# Patient Record
Sex: MALE | Race: WHITE | NOT HISPANIC OR LATINO | Employment: FULL TIME | URBAN - METROPOLITAN AREA
[De-identification: names, ages, dates, MRNs, and addresses within clinical notes are randomized per-mention and may not be internally consistent; named-entity substitution may affect disease eponyms.]

---

## 2023-09-16 LAB
ALANINE AMINOTRANSFERASE (SGPT) (U/L) IN SER/PLAS: 25 U/L (ref 10–52)
ANION GAP IN SER/PLAS: 12 MMOL/L (ref 10–20)
ASPARTATE AMINOTRANSFERASE (SGOT) (U/L) IN SER/PLAS: 17 U/L (ref 9–39)
CALCIUM (MG/DL) IN SER/PLAS: 8.9 MG/DL (ref 8.6–10.3)
CARBON DIOXIDE, TOTAL (MMOL/L) IN SER/PLAS: 25 MMOL/L (ref 21–32)
CHLORIDE (MMOL/L) IN SER/PLAS: 107 MMOL/L (ref 98–107)
CHOLESTEROL (MG/DL) IN SER/PLAS: 167 MG/DL (ref 0–199)
CHOLESTEROL IN HDL (MG/DL) IN SER/PLAS: 47.7 MG/DL
CHOLESTEROL/HDL RATIO: 3.5
CREATININE (MG/DL) IN SER/PLAS: 0.97 MG/DL (ref 0.5–1.3)
GFR MALE: >90 ML/MIN/1.73M2
GLUCOSE (MG/DL) IN SER/PLAS: 107 MG/DL (ref 74–99)
LDL: 94 MG/DL (ref 0–99)
POTASSIUM (MMOL/L) IN SER/PLAS: 4.4 MMOL/L (ref 3.5–5.3)
SODIUM (MMOL/L) IN SER/PLAS: 140 MMOL/L (ref 136–145)
TRIGLYCERIDE (MG/DL) IN SER/PLAS: 128 MG/DL (ref 0–149)
UREA NITROGEN (MG/DL) IN SER/PLAS: 20 MG/DL (ref 6–23)
VLDL: 26 MG/DL (ref 0–40)

## 2023-10-06 ENCOUNTER — DOCUMENTATION (OUTPATIENT)
Dept: CARDIOLOGY | Facility: CLINIC | Age: 53
End: 2023-10-06

## 2023-10-06 NOTE — PROGRESS NOTES
I saw this patient in August and ordered a 14-day Holter.  I just received results from the Holter monitor that was completed on 9 7 for 14 days.  Patient had no atrial fibrillation but he had 3 bursts of wide-complex tachycardia the 1 episode 6 beats that was a little bit faster 179 bpm at 520 3 in the afternoon and another episode of 4 beats and then while sleeping at 4:00 in the morning he had a run of 11 beats but that was only in the 120 beat range.  Recent electrolytes were normal.

## 2024-01-17 DIAGNOSIS — I25.10 CORONARY ARTERY DISEASE INVOLVING NATIVE CORONARY ARTERY OF NATIVE HEART, UNSPECIFIED WHETHER ANGINA PRESENT: Primary | ICD-10-CM

## 2024-01-17 DIAGNOSIS — I47.29 NSVT (NONSUSTAINED VENTRICULAR TACHYCARDIA) (MULTI): ICD-10-CM

## 2024-01-17 RX ORDER — METOPROLOL SUCCINATE 100 MG/1
100 TABLET, EXTENDED RELEASE ORAL DAILY
COMMUNITY
End: 2024-01-17 | Stop reason: SDUPTHER

## 2024-01-18 RX ORDER — METOPROLOL SUCCINATE 100 MG/1
100 TABLET, EXTENDED RELEASE ORAL DAILY
Qty: 90 TABLET | Refills: 0 | Status: SHIPPED | OUTPATIENT
Start: 2024-01-18 | End: 2024-02-13 | Stop reason: SDUPTHER

## 2024-02-12 PROBLEM — I46.9 CARDIAC ARREST (MULTI): Status: ACTIVE | Noted: 2024-02-12

## 2024-02-12 PROBLEM — I21.3 STEMI (ST ELEVATION MYOCARDIAL INFARCTION) (MULTI): Status: ACTIVE | Noted: 2024-02-12

## 2024-02-12 PROBLEM — I51.9 LV DYSFUNCTION: Status: ACTIVE | Noted: 2024-02-12

## 2024-02-12 PROBLEM — E87.6 HYPOKALEMIA: Status: ACTIVE | Noted: 2024-02-12

## 2024-02-12 PROBLEM — R07.9 CHEST PAIN: Status: ACTIVE | Noted: 2024-02-12

## 2024-02-12 PROBLEM — I25.10 ASHD (ARTERIOSCLEROTIC HEART DISEASE): Status: ACTIVE | Noted: 2024-02-12

## 2024-02-12 PROBLEM — E78.1 HYPERTRIGLYCERIDEMIA: Status: ACTIVE | Noted: 2024-02-12

## 2024-02-12 PROBLEM — F41.8 DEPRESSION WITH ANXIETY: Status: ACTIVE | Noted: 2024-02-12

## 2024-02-12 PROBLEM — I47.29 NSVT (NONSUSTAINED VENTRICULAR TACHYCARDIA) (MULTI): Status: ACTIVE | Noted: 2024-02-12

## 2024-02-12 PROBLEM — D50.9 IRON DEFICIENCY ANEMIA: Status: ACTIVE | Noted: 2024-02-12

## 2024-02-12 RX ORDER — ICOSAPENT ETHYL 1000 MG/1
2 CAPSULE ORAL 2 TIMES DAILY
COMMUNITY
End: 2024-02-13 | Stop reason: SDUPTHER

## 2024-02-12 RX ORDER — TICAGRELOR 90 MG/1
90 TABLET ORAL 2 TIMES DAILY
COMMUNITY
End: 2024-02-13 | Stop reason: WASHOUT

## 2024-02-12 RX ORDER — ATORVASTATIN CALCIUM 80 MG/1
80 TABLET, FILM COATED ORAL NIGHTLY
COMMUNITY
End: 2024-02-13 | Stop reason: SDUPTHER

## 2024-02-12 RX ORDER — NAPROXEN SODIUM 220 MG/1
81 TABLET, FILM COATED ORAL DAILY
COMMUNITY
Start: 2023-07-29 | End: 2024-02-13 | Stop reason: SDUPTHER

## 2024-02-12 RX ORDER — MELATONIN 3 MG
1 CAPSULE ORAL NIGHTLY PRN
COMMUNITY
Start: 2022-09-09 | End: 2024-02-13 | Stop reason: WASHOUT

## 2024-02-12 RX ORDER — SACUBITRIL AND VALSARTAN 24; 26 MG/1; MG/1
1 TABLET, FILM COATED ORAL 2 TIMES DAILY
COMMUNITY
End: 2024-02-13 | Stop reason: SDUPTHER

## 2024-02-12 RX ORDER — OMEPRAZOLE 20 MG/1
1 CAPSULE, DELAYED RELEASE ORAL DAILY
COMMUNITY
Start: 2022-09-07

## 2024-02-13 ENCOUNTER — OFFICE VISIT (OUTPATIENT)
Dept: CARDIOLOGY | Facility: CLINIC | Age: 54
End: 2024-02-13
Payer: COMMERCIAL

## 2024-02-13 VITALS
SYSTOLIC BLOOD PRESSURE: 114 MMHG | HEIGHT: 69 IN | HEART RATE: 76 BPM | WEIGHT: 196 LBS | DIASTOLIC BLOOD PRESSURE: 76 MMHG | BODY MASS INDEX: 29.03 KG/M2

## 2024-02-13 DIAGNOSIS — I25.10 ASHD (ARTERIOSCLEROTIC HEART DISEASE): Primary | ICD-10-CM

## 2024-02-13 DIAGNOSIS — I47.29 NSVT (NONSUSTAINED VENTRICULAR TACHYCARDIA) (MULTI): ICD-10-CM

## 2024-02-13 DIAGNOSIS — I25.10 CORONARY ARTERY DISEASE INVOLVING NATIVE CORONARY ARTERY OF NATIVE HEART, UNSPECIFIED WHETHER ANGINA PRESENT: ICD-10-CM

## 2024-02-13 DIAGNOSIS — I51.9 LV DYSFUNCTION: ICD-10-CM

## 2024-02-13 PROCEDURE — 99214 OFFICE O/P EST MOD 30 MIN: CPT | Performed by: INTERNAL MEDICINE

## 2024-02-13 PROCEDURE — 1036F TOBACCO NON-USER: CPT | Performed by: INTERNAL MEDICINE

## 2024-02-13 RX ORDER — ATORVASTATIN CALCIUM 80 MG/1
80 TABLET, FILM COATED ORAL NIGHTLY
Qty: 90 TABLET | Refills: 3 | Status: SHIPPED | OUTPATIENT
Start: 2024-02-13 | End: 2025-02-12

## 2024-02-13 RX ORDER — ICOSAPENT ETHYL 1000 MG/1
2 CAPSULE ORAL 2 TIMES DAILY
Qty: 360 CAPSULE | Refills: 3 | Status: SHIPPED | OUTPATIENT
Start: 2024-02-13 | End: 2025-02-12

## 2024-02-13 RX ORDER — EZETIMIBE 10 MG/1
10 TABLET ORAL DAILY
Qty: 30 TABLET | Refills: 11 | Status: SHIPPED | OUTPATIENT
Start: 2024-02-13 | End: 2024-03-01 | Stop reason: SINTOL

## 2024-02-13 RX ORDER — METOPROLOL SUCCINATE 100 MG/1
100 TABLET, EXTENDED RELEASE ORAL DAILY
Qty: 90 TABLET | Refills: 3 | Status: SHIPPED | OUTPATIENT
Start: 2024-02-13 | End: 2025-02-12

## 2024-02-13 RX ORDER — SACUBITRIL AND VALSARTAN 24; 26 MG/1; MG/1
1 TABLET, FILM COATED ORAL 2 TIMES DAILY
Qty: 180 TABLET | Refills: 3 | Status: SHIPPED | OUTPATIENT
Start: 2024-02-13 | End: 2025-02-12

## 2024-02-13 RX ORDER — NAPROXEN SODIUM 220 MG/1
81 TABLET, FILM COATED ORAL DAILY
Qty: 90 TABLET | Refills: 3 | Status: SHIPPED | OUTPATIENT
Start: 2024-02-13 | End: 2025-02-12

## 2024-02-13 NOTE — PATIENT INSTRUCTIONS
Thanks for following up in office today.    1)  We reviewed your cholesterol blood work I am happy to see that your HDL ( your good cholesterol) has increased which is a good thing. Exercising has helped this to increase.  Your LDL (bad cholesterol ) is not at goal yet though.  So I am adding zetia 10 mg daily.   I am sending in 30 days to see if you tolerate it.  2)  When you are done with the current bottle of brilinta you can stop it. You need to stay on aspirin for life.   You need to let me know if you are having any chest pain, heart burn.     3)  Please continue your cardiac medications as prescribed.  I have sent in refills for your medications to your pharmacy.    Follow up with BUFFY DEJESUS  in  6  months  If you have any questions, please call [phone] and choose option for Dr. Ha's nurse Beth Deluna

## 2024-02-13 NOTE — PROGRESS NOTES
Chief Complaint:   No chief complaint on file.     History Of Present Illness:    Enrrique Mariano is a 54 y.o. male presenting for 6 month follow up.  H/o STEMI and cardiac arrest in 8/22 s/p OLGA LIDIA x2 to mid LAD, dyslipidemia, superficial vein thrombosis, GERD, depression/anxiety who presents for follow up.    Since 10/2023 and last visit with Jam Vieira, telling me that since his holter he had 1-2 episodes of brief palps, but otherwise no long episodes.  He denies any chest pain/pressure, heartburn.  He has been working out lately - rides a recumbent bike at home, walks on a treadmill (3.5mph, half an hour at a time), and has been lifting some weight (chest/lats/legs).  No LE edema, orthopnea, PND.  No dizziness/syncope.      ROS:  The remainder of the review of systems was obtained, as was negative as pertains to the chief complaint.     CV testing reviewed :  9/2023  Lipid labs chol 167, HDL 47.7, LDL 94, trig 128 BMP  K 4.4  BUN 20  crea 0.97  alt 25    ast 17    Holter monitor worn from 8/24-9/7/23 Predominant underlying rhythm was SR.  Min HR 47 max 179 av 73.  Three SVT runs occurred.  Fastest 6 beats max rate of 179 longest 11 beats av . SVE's Ve's <1%.    Stress MPI 10/22   IMPRESSION:  Medium-sized area of fixed perfusion defect of the apical, apical and  mid anterior, apical and mid anteroseptal segments, consistent with  myocardial scar or hibernating myocardium in the LAD territory.  Normal left ventricular systolic function on post stress gated  imaging.    TTE 9/2022 EF 45% global hypokineses of LV trace pulmonic valve regurg     LHC  8/2022  OLGA LIDIA x2 of mid LAD      Last Recorded Vitals:  There were no vitals filed for this visit.    Past Medical History:  He has a past medical history of Other conditions influencing health status and Other iron deficiency anemias (09/15/2022).    Past Surgical History:  He has a past surgical history that includes Knee surgery (11/23/2016); Hernia repair  (11/23/2016); Back surgery (11/23/2016); and Other surgical history (09/08/2022).      Social History:  He has no history on file for tobacco use, alcohol use, and drug use.    Family History:  No family history on file.     Allergies:  Patient has no allergy information on record.    Outpatient Medications:  Current Outpatient Medications   Medication Instructions    aspirin 81 mg, oral, Daily, CHEW AND SWALLOW    atorvastatin (LIPITOR) 80 mg, oral, Nightly    Brilinta 90 mg, oral, 2 times daily, AS DIRECTED    Entresto 24-26 mg tablet 1 tablet, oral, 2 times daily    melatonin 3 mg capsule 1 capsule, oral, Nightly PRN    metoprolol succinate XL (TOPROL-XL) 100 mg, oral, Daily    omeprazole (PriLOSEC) 20 mg DR capsule 1 capsule, oral, Daily    Vascepa 1 gram capsule 2 capsules, oral, 2 times daily       Physical Exam:  Physical Exam  HENT:      Head: Normocephalic.      Nose: Nose normal.      Mouth/Throat:      Mouth: Mucous membranes are moist.   Cardiovascular:      Rate and Rhythm: Normal rate and regular rhythm.      Comments: No carotid bruits   Pulmonary:      Effort: Pulmonary effort is normal.      Breath sounds: Normal breath sounds.   Abdominal:      Palpations: Abdomen is soft.   Musculoskeletal:         General: Normal range of motion.   Skin:     General: Skin is warm and dry.   Neurological:      General: No focal deficit present.      Mental Status: He is alert.   Psychiatric:         Mood and Affect: Mood normal.            Last Labs:  CBC -  Lab Results   Component Value Date    WBC 9.1 10/07/2022    HGB 14.9 10/07/2022    HCT 43.7 10/07/2022    MCV 92 10/07/2022     10/07/2022       CMP -  Lab Results   Component Value Date    CALCIUM 8.9 09/16/2023    PHOS 3.2 08/29/2022    PROT 7.2 10/07/2022    ALBUMIN 4.5 10/07/2022    AST 17 09/16/2023    ALT 25 09/16/2023    ALKPHOS 89 10/07/2022    BILITOT 0.6 10/07/2022       LIPID PANEL -   Lab Results   Component Value Date    CHOL 167 09/16/2023     TRIG 128 09/16/2023    HDL 47.7 09/16/2023    CHHDL 3.5 09/16/2023    LDLF 94 09/16/2023    VLDL 26 09/16/2023    NHDL 108 01/14/2023       RENAL FUNCTION PANEL -   Lab Results   Component Value Date    GLUCOSE 107 (H) 09/16/2023     09/16/2023    K 4.4 09/16/2023     09/16/2023    CO2 25 09/16/2023    ANIONGAP 12 09/16/2023    BUN 20 09/16/2023    CREATININE 0.97 09/16/2023    GFRMALE >90 09/16/2023    CALCIUM 8.9 09/16/2023    PHOS 3.2 08/29/2022    ALBUMIN 4.5 10/07/2022        Lab Results   Component Value Date    BNP 16 08/27/2022           Assessment/Plan   Pleasant 55yo M with h/o STEMI and cardiac arrest in 8/22 s/p OLGA LIDIA x2 to mid LAD, dyslipidemia, superficial vein thrombosis, GERD, depression/anxiety who presents for follow up.  8/2022 - Cardiac arrest with anterior STEMI s/p OLGA LIDIA x2 to mid LAD.     Impression:  No chest pain/pressure or significant/recurrent heartburn reported (heartburn was one of his sx prior to the cardiac arrest).  No palpitations, dizziness.    Exercising regularly at moderate intensity without exertional sx  Did see EP Dr. Perera post 30 day monitor, which demonstrated NSVT - given EF 45% and only nonsustained events, no recommendation was made for ICD.      Recs:  -continue ASA 81mg daily  -ok to stop brilinta when he runs out  -metop succinate to 100mg daily  -high intensity statin therapy - atorva 80  -add ezetimibe 10mg daily for LDL in the 90's  -continue vascepa for high TG's  -tolerating entresto 24-26mg bid, BMP 9/2023 reviewed and K/Cr WNL  -discussed rebekah-mediterranean style diet  -continue with regular mod intensity exercise  -aggressive secondary prevention  -he has been able to abstain from tobacco use

## 2024-03-01 ENCOUNTER — TELEPHONE (OUTPATIENT)
Dept: CARDIOLOGY | Facility: CLINIC | Age: 54
End: 2024-03-01
Payer: COMMERCIAL

## 2024-03-01 NOTE — TELEPHONE ENCOUNTER
Called patient and he tells me that he noted issues shortly after starting to take the zetia.  He is concerned because he also finished his brilinta a few days after starting the zetia.  Told him that Dr Ha said to stop the zetia.  He will call us next week if he is still having the heartburn chest pain and or to just update us with how he is doing.   Told him if he has any concerning  pain or any other new issues to just go to the ER.         ----- Message from Melania Tao sent at 3/1/2024  2:44 PM EST -----  Regarding: Zetia Side Effects - Chest Pain and Heartburn  Hi Beth - patient called to report he is not tolerating the Zetia well and is experiencing bad heartburn and intermittent CP. He would greatly appreciate a call back to discuss next steps, thank you

## 2024-03-07 ENCOUNTER — HOSPITAL ENCOUNTER (EMERGENCY)
Facility: HOSPITAL | Age: 54
Discharge: HOME | End: 2024-03-07
Attending: STUDENT IN AN ORGANIZED HEALTH CARE EDUCATION/TRAINING PROGRAM
Payer: COMMERCIAL

## 2024-03-07 ENCOUNTER — APPOINTMENT (OUTPATIENT)
Dept: CARDIOLOGY | Facility: HOSPITAL | Age: 54
End: 2024-03-07
Payer: COMMERCIAL

## 2024-03-07 ENCOUNTER — APPOINTMENT (OUTPATIENT)
Dept: RADIOLOGY | Facility: HOSPITAL | Age: 54
End: 2024-03-07
Payer: COMMERCIAL

## 2024-03-07 VITALS
HEART RATE: 70 BPM | DIASTOLIC BLOOD PRESSURE: 66 MMHG | WEIGHT: 195 LBS | HEIGHT: 69 IN | RESPIRATION RATE: 20 BRPM | TEMPERATURE: 98.6 F | OXYGEN SATURATION: 97 % | BODY MASS INDEX: 28.88 KG/M2 | SYSTOLIC BLOOD PRESSURE: 120 MMHG

## 2024-03-07 DIAGNOSIS — R07.9 ACUTE CHEST PAIN: Primary | ICD-10-CM

## 2024-03-07 LAB
ALBUMIN SERPL BCP-MCNC: 4.4 G/DL (ref 3.4–5)
ALP SERPL-CCNC: 46 U/L (ref 33–120)
ALT SERPL W P-5'-P-CCNC: 54 U/L (ref 10–52)
ANION GAP SERPL CALC-SCNC: 11 MMOL/L (ref 10–20)
AST SERPL W P-5'-P-CCNC: 26 U/L (ref 9–39)
BASOPHILS # BLD AUTO: 0.07 X10*3/UL (ref 0–0.1)
BASOPHILS NFR BLD AUTO: 0.8 %
BILIRUB SERPL-MCNC: 0.5 MG/DL (ref 0–1.2)
BNP SERPL-MCNC: 34 PG/ML (ref 0–99)
BUN SERPL-MCNC: 20 MG/DL (ref 6–23)
CALCIUM SERPL-MCNC: 9.2 MG/DL (ref 8.6–10.3)
CARDIAC TROPONIN I PNL SERPL HS: 3 NG/L (ref 0–20)
CHLORIDE SERPL-SCNC: 105 MMOL/L (ref 98–107)
CO2 SERPL-SCNC: 27 MMOL/L (ref 21–32)
CREAT SERPL-MCNC: 1.05 MG/DL (ref 0.5–1.3)
D DIMER PPP FEU-MCNC: 272 NG/ML FEU
EGFRCR SERPLBLD CKD-EPI 2021: 85 ML/MIN/1.73M*2
EOSINOPHIL # BLD AUTO: 0.39 X10*3/UL (ref 0–0.7)
EOSINOPHIL NFR BLD AUTO: 4.5 %
ERYTHROCYTE [DISTWIDTH] IN BLOOD BY AUTOMATED COUNT: 11.7 % (ref 11.5–14.5)
GLUCOSE SERPL-MCNC: 96 MG/DL (ref 74–99)
HCT VFR BLD AUTO: 44.4 % (ref 41–52)
HGB BLD-MCNC: 15.7 G/DL (ref 13.5–17.5)
IMM GRANULOCYTES # BLD AUTO: 0.03 X10*3/UL (ref 0–0.7)
IMM GRANULOCYTES NFR BLD AUTO: 0.3 % (ref 0–0.9)
LYMPHOCYTES # BLD AUTO: 4 X10*3/UL (ref 1.2–4.8)
LYMPHOCYTES NFR BLD AUTO: 46.6 %
MAGNESIUM SERPL-MCNC: 1.97 MG/DL (ref 1.6–2.4)
MCH RBC QN AUTO: 32.7 PG (ref 26–34)
MCHC RBC AUTO-ENTMCNC: 35.4 G/DL (ref 32–36)
MCV RBC AUTO: 93 FL (ref 80–100)
MONOCYTES # BLD AUTO: 0.63 X10*3/UL (ref 0.1–1)
MONOCYTES NFR BLD AUTO: 7.3 %
NEUTROPHILS # BLD AUTO: 3.47 X10*3/UL (ref 1.2–7.7)
NEUTROPHILS NFR BLD AUTO: 40.5 %
NRBC BLD-RTO: 0 /100 WBCS (ref 0–0)
PLATELET # BLD AUTO: 212 X10*3/UL (ref 150–450)
POTASSIUM SERPL-SCNC: 4.2 MMOL/L (ref 3.5–5.3)
PROT SERPL-MCNC: 7.3 G/DL (ref 6.4–8.2)
RBC # BLD AUTO: 4.8 X10*6/UL (ref 4.5–5.9)
SODIUM SERPL-SCNC: 139 MMOL/L (ref 136–145)
WBC # BLD AUTO: 8.6 X10*3/UL (ref 4.4–11.3)

## 2024-03-07 PROCEDURE — 84484 ASSAY OF TROPONIN QUANT: CPT | Performed by: STUDENT IN AN ORGANIZED HEALTH CARE EDUCATION/TRAINING PROGRAM

## 2024-03-07 PROCEDURE — 71046 X-RAY EXAM CHEST 2 VIEWS: CPT

## 2024-03-07 PROCEDURE — 36415 COLL VENOUS BLD VENIPUNCTURE: CPT | Performed by: STUDENT IN AN ORGANIZED HEALTH CARE EDUCATION/TRAINING PROGRAM

## 2024-03-07 PROCEDURE — 85025 COMPLETE CBC W/AUTO DIFF WBC: CPT | Performed by: STUDENT IN AN ORGANIZED HEALTH CARE EDUCATION/TRAINING PROGRAM

## 2024-03-07 PROCEDURE — 80053 COMPREHEN METABOLIC PANEL: CPT | Performed by: STUDENT IN AN ORGANIZED HEALTH CARE EDUCATION/TRAINING PROGRAM

## 2024-03-07 PROCEDURE — 85379 FIBRIN DEGRADATION QUANT: CPT | Performed by: STUDENT IN AN ORGANIZED HEALTH CARE EDUCATION/TRAINING PROGRAM

## 2024-03-07 PROCEDURE — 99283 EMERGENCY DEPT VISIT LOW MDM: CPT | Mod: 25

## 2024-03-07 PROCEDURE — 83880 ASSAY OF NATRIURETIC PEPTIDE: CPT | Performed by: STUDENT IN AN ORGANIZED HEALTH CARE EDUCATION/TRAINING PROGRAM

## 2024-03-07 PROCEDURE — 83735 ASSAY OF MAGNESIUM: CPT | Performed by: STUDENT IN AN ORGANIZED HEALTH CARE EDUCATION/TRAINING PROGRAM

## 2024-03-07 PROCEDURE — 71046 X-RAY EXAM CHEST 2 VIEWS: CPT | Performed by: RADIOLOGY

## 2024-03-07 PROCEDURE — 93005 ELECTROCARDIOGRAM TRACING: CPT

## 2024-03-07 ASSESSMENT — PAIN SCALES - GENERAL
PAINLEVEL_OUTOF10: 6
PAINLEVEL_OUTOF10: 1

## 2024-03-07 ASSESSMENT — LIFESTYLE VARIABLES
EVER HAD A DRINK FIRST THING IN THE MORNING TO STEADY YOUR NERVES TO GET RID OF A HANGOVER: NO
HAVE YOU EVER FELT YOU SHOULD CUT DOWN ON YOUR DRINKING: NO
HAVE PEOPLE ANNOYED YOU BY CRITICIZING YOUR DRINKING: NO
EVER FELT BAD OR GUILTY ABOUT YOUR DRINKING: NO

## 2024-03-07 ASSESSMENT — COLUMBIA-SUICIDE SEVERITY RATING SCALE - C-SSRS
6. HAVE YOU EVER DONE ANYTHING, STARTED TO DO ANYTHING, OR PREPARED TO DO ANYTHING TO END YOUR LIFE?: NO
1. IN THE PAST MONTH, HAVE YOU WISHED YOU WERE DEAD OR WISHED YOU COULD GO TO SLEEP AND NOT WAKE UP?: NO
2. HAVE YOU ACTUALLY HAD ANY THOUGHTS OF KILLING YOURSELF?: NO

## 2024-03-07 ASSESSMENT — PAIN DESCRIPTION - FREQUENCY: FREQUENCY: CONSTANT/CONTINUOUS

## 2024-03-07 ASSESSMENT — PAIN DESCRIPTION - LOCATION: LOCATION: CHEST

## 2024-03-07 ASSESSMENT — PAIN DESCRIPTION - DESCRIPTORS: DESCRIPTORS: SQUEEZING

## 2024-03-07 ASSESSMENT — PAIN - FUNCTIONAL ASSESSMENT: PAIN_FUNCTIONAL_ASSESSMENT: 0-10

## 2024-03-07 ASSESSMENT — PAIN DESCRIPTION - ORIENTATION: ORIENTATION: LEFT

## 2024-03-07 ASSESSMENT — PAIN DESCRIPTION - PAIN TYPE: TYPE: ACUTE PAIN

## 2024-03-07 NOTE — ED PROVIDER NOTES
"HPI   Chief Complaint   Patient presents with    Chest Pain     Started about an hour ago. Hx of MI with cardiac arrest. Chest pain and radiates to left shoulder and arm. Was taken off of Brilinta a few weeks ago and started new cholesterol medication \"Zetia\"       This is a 54-year-old male with past medical history of STEMI and cardiac arrest with OLGA LIDIA x 2 to mid LAD past medical history of hyperlipidemia, GERD, anxiety who presents emerged department for chest pain.  Patient began having chest pain at 12 states the last about 30 seconds and is been intermittent.  He feels like a pressure on the left side of his chest and did go into his left shoulder.  That time he did feel diaphoretic and short of breath.  He is unsure what his last heart attack felt like since he did have a cardiac arrest.  He states been having intermittent chest pain since the medication change and they stopped the Brilinta.  Reports has never had chest pain that went into his left shoulder before.  His pain started when he was sitting down.  He does have a history of blood clots several of the superficial this was also after a knee surgery.  No other issues or concerns.  He denies any swelling of his legs.  Currently he is not having chest pain                        [place] Coma Scale Score: 15                  Patient History   Past Medical History:   Diagnosis Date    Other conditions influencing health status     No significant past medical history    Other iron deficiency anemias 09/15/2022    Other iron deficiency anemia     Past Surgical History:   Procedure Laterality Date    BACK SURGERY  11/23/2016    Back Surgery    HERNIA REPAIR  11/23/2016    Hernia Repair    KNEE SURGERY  11/23/2016    Knee Surgery    OTHER SURGICAL HISTORY  09/08/2022    Cardiac catheterization with stent placement     No family history on file.  Social History     Tobacco Use    Smoking status: Former     Types: Cigarettes    Smokeless tobacco: Never "   Substance Use Topics    Alcohol use: Not Currently    Drug use: Never       Physical Exam   ED Triage Vitals [03/07/24 1417]   Temperature Heart Rate Respirations BP   36.4 °C (97.5 °F) 69 18 146/82      Pulse Ox Temp Source Heart Rate Source Patient Position   98 % Temporal Monitor Sitting      BP Location FiO2 (%)     Left arm 21 %       Physical Exam  Constitutional:       General: He is not in acute distress.  HENT:      Head: Normocephalic and atraumatic.      Right Ear: Tympanic membrane normal.      Left Ear: Tympanic membrane normal.      Mouth/Throat:      Mouth: Mucous membranes are moist.   Eyes:      Extraocular Movements: Extraocular movements intact.      Conjunctiva/sclera: Conjunctivae normal.      Pupils: Pupils are equal, round, and reactive to light.   Cardiovascular:      Rate and Rhythm: Normal rate and regular rhythm.      Heart sounds: No murmur heard.  Pulmonary:      Effort: Pulmonary effort is normal. No respiratory distress.      Breath sounds: Normal breath sounds. No stridor. No wheezing or rales.   Abdominal:      General: Bowel sounds are normal. There is no distension.      Tenderness: There is no abdominal tenderness. There is no guarding or rebound.   Musculoskeletal:         General: No swelling, tenderness or deformity. Normal range of motion.   Skin:     General: Skin is warm and dry.      Coloration: Skin is not jaundiced.      Findings: No bruising or lesion.   Neurological:      General: No focal deficit present.      Mental Status: He is alert and oriented to person, place, and time. Mental status is at baseline.      Cranial Nerves: No cranial nerve deficit.      Motor: No weakness.   Psychiatric:         Mood and Affect: Mood normal.   Labs Reviewed - No data to display  No orders to display       ED Course & MDM   ED Course as of 03/07/24 1816   Thu Mar 07, 2024   1458 EKG on my read shows sinus rhythm at a rate of 71 bpm, no ST change or elevation nonischemic EKG.   Normal intervals. [CF]   1610 IMPRESSION:  1.  No evidence of acute cardiopulmonary process.   [CF]      ED Course User Index  [CF] Suzie Real MD         Diagnoses as of 03/07/24 1816   Acute chest pain       Medical Decision Making  This is a 54-year-old male past medical history of STEMI with stents placed hyperlipidemia who presents emergency department for intermittent chest pain currently chest pain-free.  Patient's EKG is nonischemic.  Patient troponin is negative.  His heart score is 4 for nonmodifiable risk factors but his story was very concerning.  I did attempt to get patient to be admitted admitted here but he declined.  He states he will follow-up with his primary care provider and cardiologist within 3 days.  He was obtained return precautions.  Chest x-ray shows no signs of pneumonia, pulmonary edema or pneumothorax.  His D-dimer was negative suspicion for PE at this time given patient like to go home and declined admission will send patient home.  Patient verbalized understanding the risk to leaving.  He understands he can come back to emerged part for any concerns he may have.        Procedure  Procedures     Suzie Real MD  03/07/24 1816       Suzie Real MD  03/07/24 1816

## 2024-03-08 ENCOUNTER — TELEPHONE (OUTPATIENT)
Dept: CARDIOLOGY | Facility: CLINIC | Age: 54
End: 2024-03-08
Payer: COMMERCIAL

## 2024-03-08 NOTE — TELEPHONE ENCOUNTER
Went to ED for chest pain, tingling in left arm and shoulder, declined admission, called this am wanting in with Dr. Leland cardona, first available was with KRYSTAL on Tuesday 3/12, he took the apt, told him to go back to ED if he has any more symptoms.

## 2024-03-10 PROBLEM — Z20.822 CONTACT WITH AND (SUSPECTED) EXPOSURE TO COVID-19: Status: ACTIVE | Noted: 2022-09-04

## 2024-03-10 PROBLEM — R74.01 ELEVATION OF LEVELS OF LIVER TRANSAMINASE LEVELS: Status: ACTIVE | Noted: 2022-09-04

## 2024-03-10 RX ORDER — MELATONIN 3 MG
CAPSULE ORAL
COMMUNITY
Start: 2022-09-09

## 2024-03-12 ENCOUNTER — OFFICE VISIT (OUTPATIENT)
Dept: CARDIOLOGY | Facility: CLINIC | Age: 54
End: 2024-03-12
Payer: COMMERCIAL

## 2024-03-12 VITALS
HEART RATE: 90 BPM | BODY MASS INDEX: 29.68 KG/M2 | WEIGHT: 201 LBS | DIASTOLIC BLOOD PRESSURE: 68 MMHG | SYSTOLIC BLOOD PRESSURE: 120 MMHG

## 2024-03-12 DIAGNOSIS — I51.9 LV DYSFUNCTION: ICD-10-CM

## 2024-03-12 DIAGNOSIS — I25.10 ASHD (ARTERIOSCLEROTIC HEART DISEASE): Primary | ICD-10-CM

## 2024-03-12 PROCEDURE — 1036F TOBACCO NON-USER: CPT | Performed by: PHYSICIAN ASSISTANT

## 2024-03-12 PROCEDURE — 99213 OFFICE O/P EST LOW 20 MIN: CPT | Performed by: PHYSICIAN ASSISTANT

## 2024-03-12 ASSESSMENT — ENCOUNTER SYMPTOMS
DYSURIA: 0
WHEEZING: 0
VOMITING: 0
DIARRHEA: 0
ORTHOPNEA: 0
PALPITATIONS: 0
ABDOMINAL PAIN: 0
WEAKNESS: 0
FEVER: 0
SHORTNESS OF BREATH: 0
NAUSEA: 0

## 2024-03-12 NOTE — PROGRESS NOTES
Cardiology Follow Up  Chief Complaint:   Follow up ER visit for CP     History Of Present Illness:    Enrrique Mariano is a 54 y.o. male presenting for 6 month follow up.  H/o STEMI and cardiac arrest in 8/22 s/p OLGA LIDIA x2 to mid LAD, dyslipidemia, superficial vein thrombosis, GERD, depression/anxiety who presents for follow up.     Since 10/2023 and last visit with Jam Vieira, telling me that since his holter he had 1-2 episodes of brief palps, but otherwise no long episodes.  He denies any chest pain/pressure, heartburn.  He has been working out lately - rides a recumbent bike at home, walks on a treadmill (3.5mph, half an hour at a time), and has been lifting some weight (chest/lats/legs).  No LE edema, orthopnea, PND.  No dizziness/syncope.    ER note--patient declined admit  3-12-24: This is a 54-year-old patient known to Dr. Ha.  Was last seen middle February.  Last week started developing chest discomfort that was short-lived and that it would come and go but then had an episode where his left arm felt tingly and he had some diaphoresis.  Symptoms again were very brief.  By the time he drove himself to the ER he had no symptoms.  Troponins and EKG was negative.  Patient refused admission at that time and presents today for follow-up.  States that he has not had any further chest discomfort.  He is requesting to go back on brilinta so we will use lower dose brilinta at 60 mg twice daily.  EKG reviewed did not show any acute ST-T wave changes and his blood pressure in the emergency department was normal.     Last Recorded Vitals:  There were no vitals filed for this visit.    Past Medical History:  He has a past medical history of Other conditions influencing health status and Other iron deficiency anemias (09/15/2022).    Past Surgical History:  He has a past surgical history that includes Knee surgery (11/23/2016); Hernia repair (11/23/2016); Back surgery (11/23/2016); and Other surgical history  (09/08/2022).      Social History:  He reports that he has quit smoking. His smoking use included cigarettes. He has never used smokeless tobacco. He reports that he does not currently use alcohol. He reports that he does not use drugs.    Family History:  No family history on file.     Allergies:  Patient has no known allergies.    Outpatient Medications:  Current Outpatient Medications   Medication Instructions    aspirin 81 mg, oral, Daily, CHEW AND SWALLOW    atorvastatin (LIPITOR) 80 mg, oral, Nightly    Entresto 24-26 mg tablet 1 tablet, oral, 2 times daily    melatonin 3 mg capsule oral    metoprolol succinate XL (TOPROL-XL) 100 mg, oral, Daily    omeprazole (PriLOSEC) 20 mg DR capsule 1 capsule, oral, Daily    ticagrelor (Brilinta) 90 mg tablet oral    Vascepa 2 g, oral, 2 times daily     Review of Systems   Constitutional: Negative for fever and malaise/fatigue.   Cardiovascular:  Positive for chest pain. Negative for orthopnea and palpitations.   Respiratory:  Negative for shortness of breath and wheezing.    Skin:  Negative for itching and rash.   Gastrointestinal:  Negative for abdominal pain, diarrhea, nausea and vomiting.   Genitourinary:  Negative for dysuria.   Neurological:  Negative for weakness.      Physical Exam  Constitutional:       General: He is not in acute distress.     Appearance: Normal appearance.   HENT:      Mouth/Throat:      Mouth: Mucous membranes are moist.   Neck:      Comments: No JVD  Cardiovascular:      Rate and Rhythm: Normal rate and regular rhythm.      Heart sounds: Normal heart sounds. No murmur heard.  Pulmonary:      Effort: Pulmonary effort is normal.      Breath sounds: Normal breath sounds.   Abdominal:      General: Abdomen is flat. Bowel sounds are normal.      Palpations: Abdomen is soft.   Musculoskeletal:         General: No swelling.   Skin:     General: Skin is warm and dry.   Neurological:      Mental Status: He is alert and oriented to person, place, and  time.   Psychiatric:         Mood and Affect: Mood normal.           Last Labs:  CBC -  Lab Results   Component Value Date    WBC 8.6 03/07/2024    HGB 15.7 03/07/2024    HCT 44.4 03/07/2024    MCV 93 03/07/2024     03/07/2024       CMP -  Lab Results   Component Value Date    CALCIUM 9.2 03/07/2024    PHOS 3.2 08/29/2022    PROT 7.3 03/07/2024    ALBUMIN 4.4 03/07/2024    AST 26 03/07/2024    ALT 54 (H) 03/07/2024    ALKPHOS 46 03/07/2024    BILITOT 0.5 03/07/2024       LIPID PANEL -   Lab Results   Component Value Date    CHOL 167 09/16/2023    TRIG 128 09/16/2023    HDL 47.7 09/16/2023    CHHDL 3.5 09/16/2023    LDLF 94 09/16/2023    VLDL 26 09/16/2023    NHDL 108 01/14/2023       RENAL FUNCTION PANEL -   Lab Results   Component Value Date    GLUCOSE 96 03/07/2024     03/07/2024    K 4.2 03/07/2024     03/07/2024    CO2 27 03/07/2024    ANIONGAP 11 03/07/2024    BUN 20 03/07/2024    CREATININE 1.05 03/07/2024    GFRMALE >90 09/16/2023    CALCIUM 9.2 03/07/2024    PHOS 3.2 08/29/2022    ALBUMIN 4.4 03/07/2024        Lab Results   Component Value Date    BNP 34 03/07/2024       Last Cardiology Tests:    Echo: 2022--CONCLUSIONS:   1. Left ventricular systolic function is low normal with a 45% estimated ejection fraction.   2. There is global hypokinesis of the left ventricle with minor regional variations.     Stress Test:  Nuclear Stress Test 10/24/2022--IMPRESSION:  Medium-sized area of fixed perfusion defect of the apical, apical and  mid anterior, apical and mid anteroseptal segments, consistent with  myocardial scar or hibernating myocardium in the LAD territory.  Normal left ventricular systolic function on post stress gated  imaging.        Lab review: I have personally reviewed the laboratory result(s)    Assessment/Plan   Problem List Items Addressed This Visit             ICD-10-CM       Cardiac and Vasculature    ASHD (arteriosclerotic heart disease) - Primary I25.10    Relevant  Medications    ticagrelor (Brilinta) 90 mg tablet    LV dysfunction I51.9    Relevant Medications    ticagrelor (Brilinta) 90 mg tablet   Atypical chest discomfort--patient with prior history of ST elevation MI with PCI to the LAD.  Will check treadmill stress echocardiogram.  Will get him restarted back on low-dose brilinta per his request and continue his other medications as blood pressures are well-controlled.  Patient is instructed that if he has recurrence of severe pain that is nonstop and not relieved with rest and that he is having any symptoms similar to the MI that he should go back to the emergency department and agrees to do so.  Again we will check the treadmill stress echo.  History of mild LV dysfunction--= continue medical therapy as blood pressures are well-controlled he has no signs or symptoms of CHF.  Overall patient symptoms that he had have resolved and sounds very atypical.  Will check treadmill stress echocardiogram for any sign of ischemia.  Patient does understand that if the stress testing is abnormal he will need heart catheterization but if negative this was likely something other than cardiac as his EKGs and troponins were negative.  He will continue current medical therapy and will get back to him once we know the results of the testing.      Asa Vieira PA-C  3/12/2024  11:07 AM

## 2024-03-12 NOTE — PATIENT INSTRUCTIONS
We will restart the brilinta but lower dose at 60 mg twice daily.  Continue your other usual medications.  We will arrange for treadmill stress echo to make sure no issues with your heart.  We will have you back in 6 months to see Dr. Ha.

## 2024-03-16 LAB
ATRIAL RATE: 69 BPM
P AXIS: 51 DEGREES
PR INTERVAL: 140 MS
Q ONSET: 253 MS
QRS COUNT: 11 BEATS
QRS DURATION: 99 MS
QT INTERVAL: 406 MS
QTC CALCULATION(BAZETT): 442 MS
QTC FREDERICIA: 429 MS
R AXIS: 17 DEGREES
T AXIS: 43 DEGREES
T OFFSET: 456 MS
VENTRICULAR RATE: 71 BPM

## 2024-03-26 ENCOUNTER — HOSPITAL ENCOUNTER (OUTPATIENT)
Dept: CARDIOLOGY | Facility: HOSPITAL | Age: 54
Discharge: HOME | End: 2024-03-26
Payer: COMMERCIAL

## 2024-03-26 DIAGNOSIS — I25.10 ASHD (ARTERIOSCLEROTIC HEART DISEASE): ICD-10-CM

## 2024-03-26 PROCEDURE — 93016 CV STRESS TEST SUPVJ ONLY: CPT | Performed by: INTERNAL MEDICINE

## 2024-03-26 PROCEDURE — 93017 CV STRESS TEST TRACING ONLY: CPT

## 2024-03-26 PROCEDURE — 93350 STRESS TTE ONLY: CPT | Performed by: INTERNAL MEDICINE

## 2024-03-26 PROCEDURE — 2500000004 HC RX 250 GENERAL PHARMACY W/ HCPCS (ALT 636 FOR OP/ED): Performed by: INTERNAL MEDICINE

## 2024-03-26 PROCEDURE — 93018 CV STRESS TEST I&R ONLY: CPT | Performed by: INTERNAL MEDICINE

## 2024-03-26 RX ADMIN — PERFLUTREN 2 ML OF DILUTION: 6.52 INJECTION, SUSPENSION INTRAVENOUS at 09:23

## 2024-03-27 NOTE — RESULT ENCOUNTER NOTE
Stress testing ordered as patient was having some symptoms.  He requested to go back on brilinta.  When I talk to them today he states that he has had no further chest discomfort since restarting brilinta.  I did order a stress echocardiogram that showed no EKG changes but the echocardiographic portion did show concerning ischemia in the LAD territory.  I called patient with the results and he was not thrilled about the prospect of having another heart catheterization so being that he is asymptomatic he would like me to review it with Dr. Ha and if she feels that heart catheterization is necessary he will proceed.  Again I will review that with her and get back to them as soon as possible after spring break is over next week but the patient also understands that if he has recurrence of worsening symptoms or prolonged symptoms to immediately go to the emergency department.

## 2024-04-09 ENCOUNTER — TELEPHONE (OUTPATIENT)
Dept: CARDIOLOGY | Facility: CLINIC | Age: 54
End: 2024-04-09
Payer: COMMERCIAL

## 2024-04-09 DIAGNOSIS — R94.39 ABNORMAL STRESS ECHOCARDIOGRAM: Primary | ICD-10-CM

## 2024-04-09 DIAGNOSIS — I25.10 CORONARY ARTERY DISEASE INVOLVING NATIVE CORONARY ARTERY OF NATIVE HEART WITHOUT ANGINA PECTORIS: ICD-10-CM

## 2024-04-09 NOTE — TELEPHONE ENCOUNTER
Called patient with precath instructions.  Aware of date and time of arrival and procedure is 0830.  NPO after midnight can take his asa, brilinta, and metorprolol with a sip of water.  Someone needs to drive you home after the procedure but pack a bag in case you have to spend the night. Is aware needs to have current labs.  Confirmed allergies and is not allergic to shellfish or IV dye.    --- Message from Shannan Bullock sent at 4/9/2024  2:44 PM EDT -----  Regarding: [facility]  Patient is scheduled for cath on: 5/1/2024  Arrival: 7:30 am  Please call pt with instructions. Thank you.

## 2024-04-19 ENCOUNTER — TELEPHONE (OUTPATIENT)
Dept: CARDIOLOGY | Facility: CLINIC | Age: 54
End: 2024-04-19
Payer: COMMERCIAL

## 2024-04-19 NOTE — TELEPHONE ENCOUNTER
I did call patient back and reviewed meds with him again.  ----- Message from Tim Alvarez sent at 4/19/2024  3:14 PM EDT -----  Regarding: Heart Cath medication concern  Patient called and is speaking about the heart cath that is coming up. He did say you spoke with him about medications but not all I guess that he takes that he stated. He would doesn't want to take the wrong medication and have to reschedule.     He would like to speak with you again about medications that ayaz talked about before the cath. Thank you

## 2024-04-20 ENCOUNTER — LAB (OUTPATIENT)
Dept: LAB | Facility: LAB | Age: 54
End: 2024-04-20
Payer: COMMERCIAL

## 2024-04-20 DIAGNOSIS — I25.10 CORONARY ARTERY DISEASE INVOLVING NATIVE CORONARY ARTERY OF NATIVE HEART WITHOUT ANGINA PECTORIS: ICD-10-CM

## 2024-04-20 LAB
ANION GAP SERPL CALC-SCNC: 10 MMOL/L (ref 10–20)
BASOPHILS # BLD AUTO: 0.07 X10*3/UL (ref 0–0.1)
BASOPHILS NFR BLD AUTO: 0.9 %
BUN SERPL-MCNC: 17 MG/DL (ref 6–23)
CALCIUM SERPL-MCNC: 9 MG/DL (ref 8.6–10.3)
CHLORIDE SERPL-SCNC: 105 MMOL/L (ref 98–107)
CO2 SERPL-SCNC: 28 MMOL/L (ref 21–32)
CREAT SERPL-MCNC: 1.03 MG/DL (ref 0.5–1.3)
EGFRCR SERPLBLD CKD-EPI 2021: 87 ML/MIN/1.73M*2
EOSINOPHIL # BLD AUTO: 0.37 X10*3/UL (ref 0–0.7)
EOSINOPHIL NFR BLD AUTO: 4.8 %
ERYTHROCYTE [DISTWIDTH] IN BLOOD BY AUTOMATED COUNT: 11.8 % (ref 11.5–14.5)
GLUCOSE SERPL-MCNC: 106 MG/DL (ref 74–99)
HCT VFR BLD AUTO: 43.9 % (ref 41–52)
HGB BLD-MCNC: 14.5 G/DL (ref 13.5–17.5)
IMM GRANULOCYTES # BLD AUTO: 0.04 X10*3/UL (ref 0–0.7)
IMM GRANULOCYTES NFR BLD AUTO: 0.5 % (ref 0–0.9)
LYMPHOCYTES # BLD AUTO: 2.69 X10*3/UL (ref 1.2–4.8)
LYMPHOCYTES NFR BLD AUTO: 34.8 %
MCH RBC QN AUTO: 31.7 PG (ref 26–34)
MCHC RBC AUTO-ENTMCNC: 33 G/DL (ref 32–36)
MCV RBC AUTO: 96 FL (ref 80–100)
MONOCYTES # BLD AUTO: 0.58 X10*3/UL (ref 0.1–1)
MONOCYTES NFR BLD AUTO: 7.5 %
NEUTROPHILS # BLD AUTO: 3.98 X10*3/UL (ref 1.2–7.7)
NEUTROPHILS NFR BLD AUTO: 51.5 %
NRBC BLD-RTO: 0 /100 WBCS (ref 0–0)
PLATELET # BLD AUTO: 201 X10*3/UL (ref 150–450)
POTASSIUM SERPL-SCNC: 4.5 MMOL/L (ref 3.5–5.3)
RBC # BLD AUTO: 4.58 X10*6/UL (ref 4.5–5.9)
SODIUM SERPL-SCNC: 138 MMOL/L (ref 136–145)
WBC # BLD AUTO: 7.7 X10*3/UL (ref 4.4–11.3)

## 2024-04-20 PROCEDURE — 80048 BASIC METABOLIC PNL TOTAL CA: CPT

## 2024-04-20 PROCEDURE — 36415 COLL VENOUS BLD VENIPUNCTURE: CPT

## 2024-04-20 PROCEDURE — 85025 COMPLETE CBC W/AUTO DIFF WBC: CPT

## 2024-04-22 NOTE — RESULT ENCOUNTER NOTE
Labs reviewed status post office visit and preceding heart catheterization.  Creatinine 1.03 with normal electrolytes and hemoglobin is 14.5.

## 2024-05-01 ENCOUNTER — HOSPITAL ENCOUNTER (OUTPATIENT)
Facility: HOSPITAL | Age: 54
Setting detail: OUTPATIENT SURGERY
Discharge: HOME | End: 2024-05-01
Attending: INTERNAL MEDICINE | Admitting: INTERNAL MEDICINE
Payer: COMMERCIAL

## 2024-05-01 ENCOUNTER — HOSPITAL ENCOUNTER (OUTPATIENT)
Dept: CARDIOLOGY | Facility: HOSPITAL | Age: 54
Discharge: HOME | End: 2024-05-01

## 2024-05-01 VITALS
WEIGHT: 200 LBS | HEART RATE: 63 BPM | RESPIRATION RATE: 15 BRPM | OXYGEN SATURATION: 99 % | BODY MASS INDEX: 29.62 KG/M2 | SYSTOLIC BLOOD PRESSURE: 103 MMHG | TEMPERATURE: 97.8 F | DIASTOLIC BLOOD PRESSURE: 66 MMHG | HEIGHT: 69 IN

## 2024-05-01 DIAGNOSIS — R07.89 OTHER CHEST PAIN: ICD-10-CM

## 2024-05-01 DIAGNOSIS — R94.39 ABNORMAL STRESS ECHOCARDIOGRAM: ICD-10-CM

## 2024-05-01 DIAGNOSIS — R94.30 ABNORMAL RESULT OF CARDIOVASCULAR FUNCTION STUDY, UNSPECIFIED: ICD-10-CM

## 2024-05-01 DIAGNOSIS — I25.10 CORONARY ARTERY DISEASE INVOLVING NATIVE CORONARY ARTERY OF NATIVE HEART WITHOUT ANGINA PECTORIS: ICD-10-CM

## 2024-05-01 PROCEDURE — 93005 ELECTROCARDIOGRAM TRACING: CPT | Mod: 59

## 2024-05-01 PROCEDURE — 7100000009 HC PHASE TWO TIME - INITIAL BASE CHARGE: Performed by: INTERNAL MEDICINE

## 2024-05-01 PROCEDURE — 93458 L HRT ARTERY/VENTRICLE ANGIO: CPT | Performed by: INTERNAL MEDICINE

## 2024-05-01 PROCEDURE — C1887 CATHETER, GUIDING: HCPCS | Performed by: INTERNAL MEDICINE

## 2024-05-01 PROCEDURE — 7100000010 HC PHASE TWO TIME - EACH INCREMENTAL 1 MINUTE: Performed by: INTERNAL MEDICINE

## 2024-05-01 PROCEDURE — 2720000007 HC OR 272 NO HCPCS: Performed by: INTERNAL MEDICINE

## 2024-05-01 PROCEDURE — 2500000005 HC RX 250 GENERAL PHARMACY W/O HCPCS: Performed by: INTERNAL MEDICINE

## 2024-05-01 PROCEDURE — 2550000001 HC RX 255 CONTRASTS: Performed by: INTERNAL MEDICINE

## 2024-05-01 PROCEDURE — C1894 INTRO/SHEATH, NON-LASER: HCPCS | Performed by: INTERNAL MEDICINE

## 2024-05-01 PROCEDURE — 99152 MOD SED SAME PHYS/QHP 5/>YRS: CPT | Performed by: INTERNAL MEDICINE

## 2024-05-01 PROCEDURE — 99153 MOD SED SAME PHYS/QHP EA: CPT | Performed by: INTERNAL MEDICINE

## 2024-05-01 PROCEDURE — 2500000004 HC RX 250 GENERAL PHARMACY W/ HCPCS (ALT 636 FOR OP/ED): Performed by: INTERNAL MEDICINE

## 2024-05-01 PROCEDURE — 2500000006 HC RX 250 W HCPCS SELF ADMINISTERED DRUGS (ALT 637 FOR ALL PAYERS): Performed by: NURSE PRACTITIONER

## 2024-05-01 PROCEDURE — C1760 CLOSURE DEV, VASC: HCPCS | Performed by: INTERNAL MEDICINE

## 2024-05-01 RX ORDER — NITROGLYCERIN 40 MG/100ML
INJECTION INTRAVENOUS AS NEEDED
Status: DISCONTINUED | OUTPATIENT
Start: 2024-05-01 | End: 2024-05-01 | Stop reason: HOSPADM

## 2024-05-01 RX ORDER — ACETAMINOPHEN 325 MG/1
650 TABLET ORAL EVERY 6 HOURS PRN
Status: DISCONTINUED | OUTPATIENT
Start: 2024-05-01 | End: 2024-05-01 | Stop reason: HOSPADM

## 2024-05-01 RX ORDER — LIDOCAINE HYDROCHLORIDE 20 MG/ML
INJECTION, SOLUTION INFILTRATION; PERINEURAL AS NEEDED
Status: DISCONTINUED | OUTPATIENT
Start: 2024-05-01 | End: 2024-05-01 | Stop reason: HOSPADM

## 2024-05-01 RX ORDER — FENTANYL CITRATE 50 UG/ML
INJECTION, SOLUTION INTRAMUSCULAR; INTRAVENOUS AS NEEDED
Status: DISCONTINUED | OUTPATIENT
Start: 2024-05-01 | End: 2024-05-01 | Stop reason: HOSPADM

## 2024-05-01 RX ORDER — SODIUM CHLORIDE 9 MG/ML
1000 INJECTION, SOLUTION INTRAVENOUS ONCE AS NEEDED
Status: DISCONTINUED | OUTPATIENT
Start: 2024-05-01 | End: 2024-05-01 | Stop reason: HOSPADM

## 2024-05-01 RX ORDER — ACETAMINOPHEN 650 MG/1
650 SUPPOSITORY RECTAL EVERY 6 HOURS PRN
Status: DISCONTINUED | OUTPATIENT
Start: 2024-05-01 | End: 2024-05-01 | Stop reason: HOSPADM

## 2024-05-01 RX ORDER — HEPARIN SODIUM 1000 [USP'U]/ML
INJECTION, SOLUTION INTRAVENOUS; SUBCUTANEOUS AS NEEDED
Status: DISCONTINUED | OUTPATIENT
Start: 2024-05-01 | End: 2024-05-01 | Stop reason: HOSPADM

## 2024-05-01 RX ORDER — MIDAZOLAM HYDROCHLORIDE 1 MG/ML
INJECTION, SOLUTION INTRAMUSCULAR; INTRAVENOUS AS NEEDED
Status: DISCONTINUED | OUTPATIENT
Start: 2024-05-01 | End: 2024-05-01 | Stop reason: HOSPADM

## 2024-05-01 RX ORDER — SODIUM CHLORIDE 9 MG/ML
1.5 INJECTION, SOLUTION INTRAVENOUS CONTINUOUS
Status: DISCONTINUED | OUTPATIENT
Start: 2024-05-01 | End: 2024-05-01 | Stop reason: HOSPADM

## 2024-05-01 RX ORDER — ACETAMINOPHEN 160 MG/5ML
650 SOLUTION ORAL EVERY 6 HOURS PRN
Status: DISCONTINUED | OUTPATIENT
Start: 2024-05-01 | End: 2024-05-01 | Stop reason: HOSPADM

## 2024-05-01 RX ORDER — MORPHINE SULFATE 2 MG/ML
2 INJECTION, SOLUTION INTRAMUSCULAR; INTRAVENOUS EVERY 6 HOURS PRN
Status: DISCONTINUED | OUTPATIENT
Start: 2024-05-01 | End: 2024-05-01 | Stop reason: HOSPADM

## 2024-05-01 RX ADMIN — TICAGRELOR 120 MG: 60 TABLET ORAL at 09:15

## 2024-05-01 ASSESSMENT — PAIN SCALES - GENERAL
PAINLEVEL_OUTOF10: 0 - NO PAIN

## 2024-05-01 ASSESSMENT — ENCOUNTER SYMPTOMS
PALPITATIONS: 0
PSYCHIATRIC NEGATIVE: 1
SHORTNESS OF BREATH: 0
NEUROLOGICAL NEGATIVE: 1
CONSTITUTIONAL NEGATIVE: 1
WHEEZING: 0

## 2024-05-01 ASSESSMENT — COLUMBIA-SUICIDE SEVERITY RATING SCALE - C-SSRS
1. IN THE PAST MONTH, HAVE YOU WISHED YOU WERE DEAD OR WISHED YOU COULD GO TO SLEEP AND NOT WAKE UP?: NO
2. HAVE YOU ACTUALLY HAD ANY THOUGHTS OF KILLING YOURSELF?: NO
6. HAVE YOU EVER DONE ANYTHING, STARTED TO DO ANYTHING, OR PREPARED TO DO ANYTHING TO END YOUR LIFE?: NO

## 2024-05-01 ASSESSMENT — PAIN - FUNCTIONAL ASSESSMENT: PAIN_FUNCTIONAL_ASSESSMENT: 0-10

## 2024-05-01 NOTE — POST-PROCEDURE NOTE
Physician Transition of Care Summary  Invasive Cardiovascular Lab    Procedure Date: 5/1/2024  Attending:    * Francisco Ha - Primary  Resident/Fellow/Other Assistant: Surgeons and Role:  * No surgeons found with a matching role *    Indications:   Pre-op Diagnosis     * Abnormal stress echocardiogram [R94.39]     * Coronary artery disease involving native coronary artery of native heart without angina pectoris [I25.10]    Post-procedure diagnosis:   Post-op Diagnosis     * Abnormal stress echocardiogram [R94.39]     * Coronary artery disease involving native coronary artery of native heart without angina pectoris [I25.10]    Procedure(s):   Left Heart Cath  47844 - MT CATH PLMT L HRT & ARTS W/NJX & ANGIO IMG S&I        Procedure Findings:   Disease progression of Dominant LCX with OM1 with moderate/diffuse disease-small vessel.   OM2 proximal to mid 50-70% disease-large vessel    LAD ostial disease with patent LAD stents.   RCA-non-dominant with mid 60% disease    Description of the Procedure:   LHC  RRA>TR band     Complications:   None     Stents/Implants:   Implants       No implant documentation for this case.            Anticoagulation/Antiplatelet Plan:   Brilinta 60 mg and ASA 81 mg     Estimated Blood Loss:   4 mL    Anesthesia: Moderate Sedation Anesthesia Staff: No anesthesia staff entered.    Any Specimen(s) Removed:   No specimens collected during this procedure.    Disposition:   Recovery and home today     Recs:   Continue medical management           Electronically signed by: ALYCIA Ellis, 5/1/2024 10:13 AM

## 2024-05-01 NOTE — PRE-SEDATION DOCUMENTATION
"Interventional Radiology Preprocedure Note    Enrrique Mariano   Indication for procedure: Diagnoses of Abnormal stress echocardiogram and Coronary artery disease involving native coronary artery of native heart without angina pectoris were pertinent to this visit. Here for C to r/o obstructive CAD.    Relevant review of systems: NA      /75   Pulse 66   Temp 36.6 °C (97.8 °F) (Temporal)   Resp 16   Ht 1.753 m (5' 9\")   Wt 90.7 kg (200 lb)   SpO2 98%   BMI 29.53 kg/m²    Relevant Labs:   Lab Results   Component Value Date    CREATININE 1.03 04/20/2024    EGFR 87 04/20/2024    INR 1.1 08/27/2022    PROTIME 12.2 08/27/2022       Planned Sedation/Anesthesia: Moderate    Airway assessment: normal    Directed physical examination:    See full H&P    Mallampati: II (hard and soft palate, upper portion of tonsils and uvula visible)    ASA Score: ASA 3 - Patient with moderate systemic disease with functional limitations    Benefits, risks and alternatives of procedure and planned sedation have been discussed with the patient and/or their representative. All questions answered and they agree to proceed.     Effie Vilchis, APRN-CNP   "

## 2024-05-01 NOTE — Clinical Note
Closure device placed in the right radial artery. Site closed by radial compression system. 13cc air

## 2024-05-01 NOTE — NURSING NOTE
Final access site assessment WNL, no oozing or hematoma, distal pulse 2+. Dressing clean, dry, and intact. IV removed and dressing applied.     Homegoing instructions specific to procedure given, patient verbalized understanding.  Discharge criteria met: patient easily arousable, responding appropriately. Vital signs +/- 20% of preprocedure baseline. Significant complications absent. Ambulates without dizziness. Pulse ox > 92% on room air or baseline O2.     Patient discharged to home, accompanied by family. Discharged via wheelchair.

## 2024-05-01 NOTE — H&P
History Of Present Illness  Enrrique Mariano is a 54 y.o. male presenting with abnormal stress test and chest pain. He has a past medical history of STEMI in August of 2022 with stents to the LAD. He reports that he has had on further chest pain. He has stopped exercising after getting the pain. He is pain free after starting the Brilinta and decreasing activities. He reports that he does not use nicotine products. He is compliant with his medications. Due to his chest pain he had stress test done that did show evidence of ischemia in the LAD territory.       Past Medical History  Past Medical History:   Diagnosis Date    Coronary artery disease     Other conditions influencing health status     No significant past medical history    Other iron deficiency anemias 09/15/2022    Other iron deficiency anemia       Surgical History  Past Surgical History:   Procedure Laterality Date    BACK SURGERY  11/23/2016    Back Surgery    CARDIAC CATHETERIZATION      CORONARY ANGIOPLASTY      HERNIA REPAIR  11/23/2016    Hernia Repair    KNEE SURGERY  11/23/2016    Knee Surgery    OTHER SURGICAL HISTORY  09/08/2022    Cardiac catheterization with stent placement        Social History  He reports that he has quit smoking. His smoking use included cigarettes. He has never used smokeless tobacco. He reports that he does not currently use alcohol. He reports that he does not use drugs.    Family History  Not on file      Allergies  Patient has no known allergies.    Review of Systems   Constitutional: Negative.    HENT: Negative.     Respiratory:  Negative for shortness of breath and wheezing.    Cardiovascular:  Positive for chest pain. Negative for palpitations and leg swelling.   Skin: Negative.    Neurological: Negative.    Psychiatric/Behavioral: Negative.          Physical Exam  Constitutional:       Appearance: Normal appearance.   HENT:      Head: Normocephalic.      Mouth/Throat:      Mouth: Mucous membranes are moist.  "  Cardiovascular:      Rate and Rhythm: Regular rhythm. Bradycardia present.      Pulses: Normal pulses.      Comments: Right radial A Barbeau   Pulmonary:      Effort: Pulmonary effort is normal.      Breath sounds: Normal breath sounds.   Abdominal:      Palpations: Abdomen is soft.   Skin:     General: Skin is warm and dry.      Capillary Refill: Capillary refill takes less than 2 seconds.   Neurological:      General: No focal deficit present.      Mental Status: He is alert and oriented to person, place, and time.   Psychiatric:         Mood and Affect: Mood normal.         Behavior: Behavior normal.          Last Recorded Vitals  Blood pressure 122/75, pulse 66, temperature 36.6 °C (97.8 °F), temperature source Temporal, resp. rate 16, height 1.753 m (5' 9\"), weight 90.7 kg (200 lb), SpO2 98%.    [place] [address]       Phone [phone] Fax [phone]     Exercise Stress Echo     Patient Name:      VIVI VELA   Ordering Provider:    [id number] SUDHIR DOSS  Study Date:        3/26/2024          Reading Physician:    [id number] Mcadams MD  MRN/PID:           [id number]           Supervising           [id number] Mcadams MD                                        Physician:  Accession#:        [id number]       Fellow:  Date of Birth/Age: 1970 / 54      Fellow:                     years  Gender:            ZULEYMA                  Nurse:                Jocelyn Potts  Admission Status:  Outpatient         Sonographer:          Mona Bustamante ANDRE  Height:            175.26 cm          Technologist:  Weight:            91.17 kg           Additional Staff:  BSA:               2.07 m2            Encounter#:           [id number]  BMI:               29.68 kg/m2        Patient Location[facility]     [facility]     Study Type:    ECHOCARDIOGRAM STRESS TEST  Diagnosis/ICD: Atherosclerotic heart disease-I25.10  Indication:    CAD     Falls Risk:     Study Details: " Correct procedure and correct patient verified verbally and with                 ID Band checked.        Patient History: Coronary artery disease, hyperlipidemia and cardiac arrest, Ventricular tachycardia, STEMI, LVD.  Allergies: None.        Medications: Brilenta, entresto, metoprolol, vascepa, atorvastatin, ASA, omeprazole, melatonin.     Patient Performance: The patient exercised to stage IV on a Harry protocol for 9 minutes and 57 seconds, achieving 13.2 METS. The peak heart rate achieved was 152 bpm, which was 91 % of the age predicted target heart rate of 167 bpm. The resting blood pressure was 111/58 mmHg with a heart rate of 61 bpm. The standing blood pressure was 124/68 mmHg with a heart rate of 68 bpm. The patient's functional capacity was above average. The patient developed no symptoms during the stress exam. The blood pressure response was normal. The test was terminated due to: fatigue. Patient has met the discharge criteria and is discharged to home.     70% maximum predicted heart rate (MPHR) is 117 bpm.  85% maximum predicted heart rate (MPHR) is 142 bpm.  100% maximum predicted heart rate (MPHR) is 167 bpm.  Peak Oxygen Use: 33-37 ml/kg/min.  Double Product (HR x BP): 219.  Exercise Capacity: 70% Achieved HR = 4.6 METS : 85% Achieved HR = 13.2 METS.        Baseline ECG: Resting ECG showed normal sinus rhythm with prior anterior infarct.     Stress ECG: Stress ECG showed sinus tachycardia, with no abnormal findings. No ST changes.     Stress Stage Data:  +-----------------+---+------+-------+----+                   HR Sys BPDias BPMETS  +-----------------+---+------+-------+----+  Baseline Resting 61 111   58           +-----------------+---+------+-------+----+  Baseline [id number] 124   68           +-----------------+---+------+-------+----+  Stage I          87 110   62           +-----------------+---+------+-------+----+  Stage II         107uto                 +-----------------+---+------+-------+----+  Stage III        99 uto                +-----------------+---+------+-------+----+  Stage IV         [id number]   71     13.2  +-----------------+---+------+-------+----+        Recovery ECG: Recovery ECG showed normal sinus rhythm, with no abnormal findings. The heart rate recovery was normal.     +------------+--+------+-------+              HRSys BPDias BP  +------------+--+------+-------+  Recovery I  94               +------------+--+------+-------+  Recovery [id number]   72       +------------+--+------+-------+  Recovery V  99887   63       +------------+--+------+-------+  Recovery VI 7899    64       +------------+--+------+-------+        Baseline Echo: Definity used as a contrast agent for endocardial border definition. Total contrast used for this procedure was 2 mL via IV push. The left ventricular internal cavity size is normal. Global LV systolic function is normal. The resting baseline ejection fraction was estimated at 55 to 60%. There are no regional wall motion abnormalities at baseline.     Stress Echo: At peak, there are stress-induced regional wall motion abnormalities. The ejection fraction is approximately 55 to 60% at peak stress. At peak stress there is anterior, anteroapical hypokinesis.     Summary:   1. The resting ejection fraction was estimated at 55 to 60% with a peak exercise ejection fraction estimated at 55 to 60%.   2. Normal global left ventricular systolic function.   3. Adequate level of stress achieved.   4. At peak, there are stress-induced regional wall motion abnormalities.   5. Normal peak stress global LV systolic function.   6. This is a positive exercise stress echocardiogram for moderate ischemia, suggestive of left anterior descending coronary artery disease.     [id number] Aldo Mcadams MD  Electronically signed on 3/26/2024 at 4:04:04 PM    [facility], Cath Lab  [address] [place] [address]     Phone [phone] Fax [phone]     Cardiovascular Catheterization Report     Patient Name:     VIVI VELA Performing Physician: [id number] Ha MD  Study Date:       8/27/2022        Verifying Physician:  Elise Ha MD  MRN/PID:          [id number]         Cardiologist:  Accession/Order#: [id number]        Referring Physician:  Elise HA  YOB: 1970         Referring Physician:  Gender:           M                Referring Physician:        Study: Left Heart Catheterization        Indications:  VIVI VELA is a 53 year old male who presents with tobacco Use - current. Resuscitated cardiac arrest and Immediate PCI for acute STEMI , with an asymptomatic chest pain assessment. Study performed as a salvage cath procedure.     Medical History:  Stress test performed: No. CTA performed: NoToma Molina accessed: No. LVEF Assessed: No.     Procedure Description:  After infiltration with 2% Lidocaine, the right femoral artery was cannulated with a modified Seldinger technique. Subsequently a 6 Guatemalan sheath was placed in the right femoral artery. After infiltration of local anesthetic, the right femoral vein was cannulated with a micropuncture technique. A 8 Guatemalan sheath was placed in the vein. Selective coronary catheterization was performed using a 6 Fr catheter(s) exchanged over a guide wire to cannulate the coronary arteries. A EBU 3.5 tip catheter was used for left coronary injections. A JR 4 tip catheter was used for right coronary injections.  Multiple injections of contrast were made into the left and right coronary arteries with angiograms recorded in multiple projections. A balloon tipped catheter was advanced through the right heart to record pressures. Cardiac output was calculated via the Cele method. After completion of the procedure, femoral artery angiography was performed. This demonstrated a common femoral artery puncture  appropriate for closure. An Angio-Seal Evolution 6F ([employer]) vascular closure device was placed per protocol. Post-procedure, the venous sheath was pulled and pressure was applied to the site.     Coronary Angiography:  The coronary circulation is left dominant.     Left Main Coronary Artery:  The left main coronary artery is very short caliber vessel. The left main arises normally from the left coronary sinus of Valsalva and bifurcates into the LAD and circumflex coronary arteries. The left main coronary artery showed no significant disease or stenosis greater than 30%.     Left Anterior Descending Coronary Artery Distribution:  The left anterior descending coronary artery is a medium-sized caliber vessel. The LAD arises normally from the left main coronary artery and wraps around the apex of the LV. The mid left anterior descending coronary artery showed 100% stenosis. This lesion was thrombotic.     Circumflex Coronary Artery Distribution:  The circumflex coronary artery is a large caliber vessel. The circumflex arises normally from the left main coronary artery. The circumflex revealed mild mid-segment atherosclerotic disease. The 1st obtuse marginal branch is a medium-sized caliber vessel. The 1st obtuse marginal branch showed diffuse mild atherosclerotic disease. The mid 1st obtuse marginal branch showed 30% stenosis. The 2nd obtuse marginal branch is a large caliber vessel. The proximal to mid 2nd obtuse marginal branch demonstrated 20-30%. The 3rd obtuse marginal branch is a small caliber vessel. The 3rd obtuse marginal branch revealed no significant disease or stenosis greater than 30%. The left posterior descending artery is a normal caliber vessel. The left posterior descending artery showed no significant disease or stenosis greater than 30%.     Right Heart Catheterization:  A balloon tipped catheter was advanced through the right heart to record pressures. Cardiac output was calculated via  the Cele method. Cardiac output is mildly decreased. No evidence of shunt. RA 5, PAP 31/16/22, PCWP 17. LVEDP 22.  Cele CO 4.6 / CI 2.2.     Right Coronary Artery Distribution:     The right coronary artery is a medium-sized caliber vessel. The RCA arises normally from the right sinus of Valsalva. The proximal to mid right coronary artery showed 60% stenosis.     Coronary Interventions:  Angiography reveals a 100% stenosis of the mid left anterior descending. Pre-intervention RANJAN flow was 0. Percutaneous coronary intervention was performed within the mid left anterior descending. The vessel was pre-dilated using a compliant balloon 2.5 mm x 20 mm at 12 MARQUISE. SYNERGY XD Everolimus drug-eluting stent 2.5 mm x 32 mm was advanced to the lesion and implanted at 12 MARQUISE. A second SYNERGY XD Everolimus drug-eluting stent 2.25 mm x 12 mm was implanted in overlap at 12 MARQUISE. The stent was post dilated using a non-compliant balloon 2.5 mm x 20 mm at 18-20 MARQUISE. Additional dilatation was performed using a non-compliant balloon 2.25 mm x 20 mm at 16-18 MARQUISE and further dilation using a non-compliant balloon 2.75 mm x 15 mm at 18 MARQUISE. The stenosis was successfully reduced from 100% to 0%. Post-intervention RANJAN flow was 3. Upfront antiplatelet was cangrelor. Heparin was given IV to achieve an ACT > 300. 6Fr EBU 3.5 guide catheter was used to engage the short LM. .014 BMW guidewire used   to cross lesion, with tip initially in a diagonal branch. One pass made with Penumbra thrombectomy catheter. Mid LAD lesion predilated with SC 2.5mm balloon at 12 marquise. Jackson Synergy XD 2.5 x 32mm OLGA LIDIA deployed in the mid LAD at 12 marquise. Stent postdilated with NC 2.5mm balloon at 18-20 marquise. A second pass was made wiith Penumbra thrombectomy catheter. Distal to stent there appeared to be some plaque shift, and a second Jackson Synergy XD 2.25 x 12mm OLGA LIDIA was deployed in an overlapping fashion with the distal portion of first stent. The stent overlap was  postdilated with NC 2.25mm balloon at 16-18 jose. The proximal to mid portion of the first stent was postdilated with an NC 2.75 x 15mm balloon at 18 jose. Good angiographic result with no significant distal embolization or evidence of dissection, RANJAN 3 flow in the LAD. Guidwire and guide were withdrawn.  RHC was performed which demonstrated only mildly depressed cardiac index (normal cardiac ouput) and normal filling pressures (except for mild elevation in PCWP).     During the procedure the patient did have recurrent episodes of VT which did not require defibrillation, however due to frequency of episodes he was loaded with amiodarone 150mg and initiated on amiodarone infusion.     During and following the case the patient had significant agitation, moving extremities ? reflex posturing, as well as gagging/heaving around ETT with high pressure alarms on the ventilator. He received multiple deep suctions. He received multiple doses versed and fentanyl, and ultimately required propofol infusion.     At case end, OGT position was confirmed and the patient received ticagrelor 180mg loading dose as well as ASA via OGT. He was continued on cangrelor infusion until dose completed.  The patient was transferred to the ICU in hemodynamically stable condition.     Coronary Intervention Comments:  PCI delay due to cardiopulmonary arrest requiring resuscitation and need for head CT prior to catheterization due to fall/hitting head and altered mental status/agitation/confusion on presentation.     Coronary Lesion Summary:  Vessel   Stenosis    Vessel Segment  LAD    100% stenosis       mid  OM 1   30% stenosis        mid  OM 2      20-30%     proximal to mid  RCA    60% stenosis  proximal to mid        Hemo Personnel:  +--------------------------------+-------------+  Name                            Duty           +--------------------------------+-------------+  Francisco Ha MD, MD  1  +--------------------------------+-------------+  Luis Layne Tech PROC SCRUB 1  +--------------------------------+-------------+  Carmen Gallardo RN                   PROC CIRC 1  +--------------------------------+-------------+  Effie Cohen RN               PROC CIRC 2  +--------------------------------+-------------+  Felecia Cohen RN           PROC RECORD 1  +--------------------------------+-------------+        Sedation Time:  +------------------------+----------------------------------------+  Sedation Start/End TimesTime                                      +------------------------+----------------------------------------+  End                     8/27/2022 18:56:02                        +------------------------+----------------------------------------+  Start                   8/27/2022 17:42:20                        +------------------------+----------------------------------------+  Drugs                   Versed 0.5 mg IV per physician for sedat  +------------------------+----------------------------------------+                Fluoroscopy Time:  +--------------------------+---------+  X-Ray Summary Fluoro Time:17.70 min  +--------------------------+---------+        +----------+---------+  Contrast: Dose:      +----------+---------+  Omnipaque:190.00 ml  +----------+---------+        Hemodynamic Pressures:     +----+----------+---------+-------------+-------------+---+----+-------+-------+  SiteDate Time   Phase  Systolic mmHg  Diastolic  ED MeanA-Wave V-Wave                   Name                    mmHg     mmHmmHg mmHg   mmHg                                                      g                     +----+----------+---------+-------------+-------------+---+----+-------+-------+    MARGA 8/27/2022 AIR REST          141           80    120                    5:12:48 PM                                                           +----+----------+---------+-------------+-------------+---+----+-------+-------+    LV 8/27/2022 AIR REST          142            4 30                        6:27:21 PM                                                          +----+----------+---------+-------------+-------------+---+----+-------+-------+   LVp 8/27/2022 AIR REST          134           -2 22                        6:27:53 PM                                                          +----+----------+---------+-------------+-------------+---+----+-------+-------+   AOp 8/27/2022 AIR REST          142           90    115                    6:28:00 PM                                                          +----+----------+---------+-------------+-------------+---+----+-------+-------+    RA 8/27/2022 AIR REST                                5      6      5      6:37:19 PM                                                          +----+----------+---------+-------------+-------------+---+----+-------+-------+    RV 8/27/2022 AIR REST           33            2  4                        6:37:39 PM                                                          +----+----------+---------+-------------+-------------+---+----+-------+-------+    PA 8/27/2022 AIR REST           31           16     22                    6:39:01 PM                                                          +----+----------+---------+-------------+-------------+---+----+-------+-------+    PW 8/27/2022 AIR REST                               17     18     19      6:40:16 PM                                                          +----+----------+---------+-------------+-------------+---+----+-------+-------+           Oxygen Saturation %:  +-----------+----------+------------+  Sample SiteO2 Sat (%)HB  (g/100ml)  +-----------+----------+------------+      SYS ART                  15.8  +-----------+----------+------------+      SYS HORACIO                  15.8  +-----------+----------+------------+      PUL ART                  15.8  +-----------+----------+------------+      PUL HORACIO                  15.8  +-----------+----------+------------+           AO       100        15.8  +-----------+----------+------------+           RA        72        15.8  +-----------+----------+------------+           AO       100        15.8  +-----------+----------+------------+           PA        75        15.8  +-----------+----------+------------+        Cardiac Outputs:  +---------------+------------------+  NESS CO (l/min)NESS CI (l/min/m2)  +---------------+------------------+              4.6               2.2  +---------------+------------------+        Complications:  No in-lab complications observed.     Cardiac Cath Transition of Care Summary:  Post Procedure Diagnosis: OLGA LIDIA of LAD.  Blood Loss:               Estimated blood loss during the procedure was 10mL                            mls.  Specimens Removed:        Number of specimen(s) removed: for ACT and sats.        Recommendations:  Maximize medical therapy.  Telemetry monitoring.  Monitor vitals and arterial access site/pulses.  Post cardiac arrest care with cooling protocol.  Echocardiogram.  Neuro consult once rewarmed.  Patient counseled and advised to discontinue smoking.  Beta blocker therapy.     ____________________________________________________________________________________  CONCLUSIONS:   1. Out of hospital cardiac arrest s/p ROSC.   2. STEMI s/p successful OLGA LIDIA x2 of mid % thrombotic culprit lesion.   3. Large dominant LCx with mild nonobstructive disease.   4. Medium-sized, non-dominant RCA with prox-mid ~ 60% stenosis.   5. Left Ventricular end-diastolic pressure = 22.      ____________________________________________________________________________________  CPT Codes:  Left Heart Cath (visualization of coronaries) and LV-71666; Moderate Sedation Services initial 15 minutes patient >5 years-10371; Moderate Sedation Services 1st additional 15 minutes patient >5 years-66577; Moderate Sedation Services 2nd additional 15 minutes patient >5 years-89238; Moderate Sedation Services 3rd additional 15 minutes patient >5 years-53290; Moderate Sedation Services 4th additional 15 minutes patient >5 years-28573; Revasc during AMI stent/angio/atherc,ins asp Thrombectomy, Left Anterior Descending single Vessel (PCI)-53991.LD; Right Heart Cath O2/Cardiac output without biopsy (RHC)-34945; Complicated/Unusual Procedure-MOD22     ICD 10 Codes:  I46.2-Cardiac arrest due to underlying cardiac condition; I21.02-ST elevation (STEMI) myocardial infarction involving left anterior descending coronary artery; I47.2-Ventricular tachycardia     [id number] Francisco Ha MD  Performing Physician  Electronically signed by [id number] Francisco Ha MD on 8/28/2022 at 12:24:56 PM        cc Report to: [id number] HA      Final     Assessment/Plan   Active Problems:    Abnormal stress echocardiogram    Coronary artery disease involving native coronary artery of native heart without angina pectoris      Plan: [facility] to r/o obstructive CAD       I spent 65 minutes in the professional and overall care of this patient.      Effie Vilchis, APRN-CNP

## 2024-05-01 NOTE — DISCHARGE INSTRUCTIONS
"Please get your lipid panel drawn before your follow up with cardiology. If the LDL \"bad cholesterol\" is above 70, Dr. Ha would like to start an injectable medication called Repatha to drop your LDL levels down.     If you have any questions, please call the Cath Lab Nurse Practitioner Celina Vilchis at [phone] and leave a message. She will return your call the same day if calling before 3 PM, M-F. If you call on the weekend you can expect a call back on Monday morning. You may also call the Cath Lab at [phone] M-F, 7-3:30 with any questions. Weekends and after hours please call your cardiologist office number to reach a physician on call. The heart group office number is [phone]           CARDIAC CATHETERIZATION DISCHARGE INSTRUCTIONS     FOR SUDDEN AND SEVERE CHEST PAIN, SHORTNESS OF BREATH, EXCESSIVE BLEEDING, SIGNS OF STROKE, OR CHANGES IN MENTAL STATUS YOU SHOULD CALL 911 IMMEDIATELY.     If your provider has prescribed aspirin and/or clopidogrel (Plavix), or prasugrel (Effient), or ticagrelor (Brilinta), DO NOT STOP THESE MEDICATIONS for any reason without talking to your cardiologist first. If any of these were prescribed, you must take them every day without missing a single dose. If you are getting low on these medications, contact your provider immediately for a refill.     FOR NEXT 24 HOURS  - Upon discharge, you should return home and rest for the remainder of the day and evening. You do not have to stay on bed rest but should not be very active.  It is recommended a responsible adult be with you for the first 24 hours after the procedure.    - No driving for 24 hours after procedure. Please arrange for someone to drive you home from the hospital today.     - Do not drive, operate machinery, or use power tools for 24 hours after your procedure.     - Do not make any legal decisions for 24 hours after your procedure.     - Do not drink alcoholic beverages for 24 hours after your " procedure.    WOUND CARE   *FOR FEMORAL (LEG) ACCESS*  ·      Avoid heavy lifting (over 10 pounds) for 7 days, squatting or excessive bending for 2 days, and strenuous exercise for 7 days.  ·      No submerged bathing, swimming, or hot tubs for the next 7 days, or until fully healed.  ·      Avoid sexual activity for 3-4 days until any groin discomfort has ceased.     *FOR RADIAL (WRIST) ACCESS*  ·      No lifting more than 5 pounds or excessive use of the wrist for 24 hours - for example, treat your wrist as if it is sprained.  ·      Do not engage in vigorous activities (tennis, golf, bowling, weights) for at least 48 hours after the procedure.  ·      Do not submerge the wrist for 7 days after the procedure.  ·      You should expect mild tingling in your hand and tenderness at the puncture site for up to 3 days.    - The transparent dressing should be removed from the site 24 hours after the procedure.  Wash the site gently with soap and water. Rinse well and pat dry. Keep the area clean and dry. You may apply a Band-Aid to the site. Avoid lotions, ointments, or powders until fully healed.     - You may shower the day after your procedure.      - It is normal to notice a small bruise around the puncture site and/or a small grape sized or smaller lump. Any large bruising or large lump warrants a call to the office.     - If bleeding should occur, lay down and apply pressure to the affected area for 10 minutes.  If the bleeding stops notify your physician.  If there is a large amount of bleeding or spurting of blood CALL 911 immediately.  DO NOT drive yourself to the hospital.    - You may experience some tenderness, bruising or minimal inflammation.  If you have any concerns, you may contact the Cath Lab or if any of these symptoms become excessive, contact your cardiologist or go to the emergency room.     OTHER INSTRUCTIONS  - You may take acetaminophen (Tylenol) as directed for discomfort.  If pain is not  relieved with acetaminophen (Tylenol), contact your doctor.    - If you notice or experience any of the following, you should notify your doctor or seek medical attention  Chest pain or discomfort  Change in mental status or weakness in extremities.  Dizziness, light headedness, or feeling faint.  Change in the site where the procedure was performed, such as bleeding or an increased area of bruising or swelling.  Tingling, numbness, pain, or coolness in the leg/arm beyond the site where the procedure was performed.  Signs of infection (i.e. shaking chills, temperature > 100 degrees Fahrenheit, warmth, redness) in the leg/arm area where the procedure was performed.  Changes in urination   Bloody or black stools  Vomiting blood  Severe nose bleeds  Any excessive bleeding    - If you DO NOT have an appointment with your cardiologist within 2-4 weeks following your procedure, please contact their office.      Important ways to reduce your risk of coronary artery disease by healthy diet, maintaining a healthy weight, exercising regularly and stop using tobacco products.     Mediterranean Diet    About this topic  This is a heart healthy diet. It is based on widely used foods and cooking styles from many countries around the [place]. The main pattern for the diet is more plant foods and monounsaturated fats, or good fats, like olive oil. Protein in this diet comes from seafood, legumes, nuts, seeds, and poultry and eggs in lowered amounts. You will also eat more whole grains, vegetables, and fruits and moderate amounts of alcohol are also included. This diet has less red meats, dairy products, and processed foods.    What will the results be?  Your diet will have less saturated fat, cholesterol, calories, sodium, and added sugars. Your diet will be higher in fiber. This will help to keep your blood sugar steady. This diet lowers the chance of heart disease and other health problems.  What lifestyle changes are  needed?  If you do not often eat this way, you will need to change your eating habits. Be sure to get regular exercise. It is believed to help the health benefits of this diet.  What changes to diet are needed?  You may need to limit the amount of red meat and processed foods in your diet. Ask your dietitian for help planning meals that are right for you.  What foods are good to eat?  Plenty of fish and other seafood  Fresh, frozen, or canned fruits and vegetables  Nuts and nut butters and dried beans, lentils, or peas  Foods high in fiber like whole grains and whole grain products  Olive oil (good fat), peanut or canola oil, margarine, or spreads that list vegetable oil as the first ingredient and do not contain trans fat or partially hydrogenated oil  Small amounts of poultry and eggs  What foods should be limited or avoided?  Red meats  Sweets, desserts, and processed foods  Butter, oils, and fats that contain trans fats or are hydrogenated or partially hydrogenated  Gravies and sauces  What problems could happen?  Your weight may rise because your diet will be higher in fat from olive oil and nuts.  You may have lower iron levels. Be sure to eat foods rich in iron. Also, eat foods rich in vitamin C. This will help your body take in iron.  You may have lower calcium levels because you are eating less dairy products. Ask your doctor if you need to take a calcium supplement.  Wine is often thought of as part of a Mediterranean diet. It is not needed and you may choose not to include it. Avoid wine if you are prone to alcohol abuse or are pregnant. Also, avoid it if you are at risk for breast cancer, have liver problems, or have other illnesses that make it important for you to not have alcohol.  When do I need to call the doctor?  If you have any concerns about your diet     Health risks of obesity    The Basics  Written by the doctors and editors at [facility]  What does it mean to have obesity? -- Doctors use a  "calculation called \"body mass index,\" or \"BMI,\" to decide whether a person is underweight, at a healthy weight, overweight, or has obesity.  Your BMI will tell you which category you are in based on your weight and height (figure 1):  ?If your BMI is between 25 and 29.9, you are overweight.  ?If your BMI is 30 or greater, you have obesity.  Obesity is a problem, because it increases the risks of many different health problems. It can also make it hard for you to move, breathe, and do other things that people who are at a healthy weight can do easily.  What are the health risks of obesity? -- Having obesity increases a person's risk of developing many health problems. Here are just a few examples:  ?Diabetes  ?High blood pressure  ?High cholesterol  ?Heart disease (including heart attacks)  ?Stroke  ?Sleep apnea (a disorder in which you stop breathing for short periods while asleep)  ?Asthma  ?Cancer  Does having obesity shorten a person's life? -- Yes. Studies show that people with obesity die younger than people who are a healthy weight. They also show that the risk of death goes up the heavier a person is. The degree of increased risk depends on how long the person has had obesity, and on what other medical problems they have.  People with \"central obesity\" might also be at risk of dying younger. Central obesity means carrying extra weight in the belly area, even if the BMI is normal.  Should I see an expert? -- Yes. If you are overweight or have obesity, you can talk to your doctor or nurse. They might have suggestions for healthy ways to lose weight. It can also help to work with a dietitian (food and nutrition expert). A dietitian can help you choose healthy foods and plan meals.  Are there medical treatments that can help me lose weight? -- Yes. There are medicines and surgery to help with weight loss. But those treatments are only for people who have not been able to lose weight through lifestyle changes such " as diet and exercise. Also, weight loss treatments do not take the place of diet and exercise. People who have those treatments must also change how they eat and how active they are.  What can I do to prevent the problems caused by obesity? -- The best thing that you can do is lose weight. But even if weight loss is not possible, you can improve your health and lower your risk if you:  ?Become more active - Many types of physical activity can help, including walking. You can start with a few minutes a day and add more as you get stronger and build up your endurance. Anything that gets your body moving is good for you.  ?Improve your diet - It is healthy to have regular meal times, eat smaller portions, and not skip meals. Limit sweets, and avoid processed foods. Try to eat more vegetables and fruits instead.  ?Quit smoking (if you smoke) - Some people start eating more after they stop smoking, so try to make healthy food choices. Even if it increases your appetite, quitting smoking is still one of the best things that you can do to improve your health.  ?Limit alcohol - For females, drink no more than 1 drink a day. For males, drink no more than 2 drinks a day.  What causes obesity? -- The thing that increases a person's risk the most is having an unhealthy lifestyle. Most people develop obesity because they eat too much, eat unhealthy foods, and move too little. That's especially true of people who watch too much TV. But there are also other things that seem to increase the risk of obesity that many people do not know about. Here are some things that might affect a person's chance of developing obesity:  ?Mother's habits during and after pregnancy - People who eat a lot of calories, have diabetes, or smoke during pregnancy have a higher chance of having babies who have obesity as adults. Also, babies who drink formula might be more likely than  babies to develop obesity later in life.  ?Habits and weight  "gain during childhood - Children or teens who are overweight or have obesity are more likely to become have obesity as an adult.  ?Sleeping too little - People who do not get enough sleep are more likely to develop obesity than people who sleep enough.  ?Taking certain medicines - Long-term use of certain medicines, such as some medicines to treat depression, can cause weight gain. If you are concerned that 1 of your medicines might be making you gain weight, talk to your doctor or nurse. They might be able to switch you to a different medicine instead.  ?Certain hormonal conditions - Some hormonal problems can increase the risk of developing obesity. For example, a condition called \"polycystic ovary syndrome\" can cause weight gain, along with other symptoms like irregular periods.  What if I want to get pregnant? -- If you are overweight or have obesity, it might be harder to get pregnant. For males, obesity can also cause sex problems, like having trouble getting or keeping an erection. This is more likely if you also have high blood pressure or diabetes.  What if my child has obesity? -- In children, obesity has many of the same risks as it does in adults. For example, it can increase the risk of diabetes, high blood pressure, asthma, and sleep apnea. It can also cause added problems related to childhood. For example, obesity can make children grow faster than normal and cause girls to go through puberty earlier than usual.  All topics are updated as new evidence becomes available and our peer review process is complete.  This topic retrieved from [employer] [facility] on: Jan 11, 2024.  Topic 18724 Version 17.0  Release: 31.6.4 - C32.10  © 2024 [employer], [employer] and/or its affiliates. All rights reserved.  figure 1: Your body mass index (BMI)        If you use tobacco products please review   Quitting smoking    The Basics  Written by the doctors and editors at [employer] [facility]  What are the benefits of quitting smoking? -- Quitting " "smoking can dramatically improve your health and help you live longer. It lowers your risk of heart disease, lung disease, kidney failure, cancer, infection, stomach problems, and diabetes.  Quitting smoking can also lower your chances of getting osteoporosis, a condition that makes your bones weak.  Quitting is not easy for most people, and it might take several tries to completely quit. But help and support are available. Quitting smoking will improve your health no matter how old you are, even if you have smoked for a long time.  What should I do if I want to quit smoking? -- It's a good idea to start by talking with your doctor or nurse. It is possible to quit on your own, without help. But getting help greatly increases your chances of quitting successfully.  When you are ready to quit, you will make a plan to:  ?Set a quit date.  ?Tell your family and friends that you plan to quit.  ?Plan ahead for the challenges you will face, such as cigarette cravings.  ?Remove cigarettes from your home, car, and work.  How can my doctor or nurse help? -- Your doctor or nurse can give you advice on the best way to quit. They can also give you medicines to:  ?Reduce your craving for cigarettes  ?Reduce your \"withdrawal\" symptoms (symptoms that happen when you stop smoking)  Your doctor or nurse can also help you find a counselor to talk to. For most people who are trying to quit smoking, it works best to use both medicines and counseling.  You can also get help from a free phone line (8-251-QUIT-NOW, or 1-924.153.2106) or go online to www.smokefree.gov.  What are the symptoms of withdrawal? -- When you stop smoking, you might have symptoms such as:  ?Trouble sleeping  ?Feeling irritable, anxious, or restless  ?Getting frustrated or angry  ?Having trouble thinking clearly  These symptoms can be hard to deal with, which is why it can be so hard to quit. But medicines can help.  Some people who stop smoking become temporarily " depressed. Some people need treatment for depression, such as counseling or medicines or both. People with depression might:  ?No longer enjoy or care about doing the things they used to like to do  ?Feel sad, down, hopeless, nervous, or cranky most of the day, almost every day  ?Lose or gain weight  ?Sleep too much or too little  ?Feel tired or like they have no energy  ?Feel guilty or like they are worth nothing  ?Forget things or feel confused  ?Move and speak more slowly than usual  ?Act restless or have trouble staying still  ?Think about death or suicide  If you think you might be depressed, tell your doctor or nurse right away. They can talk to you about your symptoms and recommend treatment if needed.  Get help right away if you are thinking of hurting or killing yourself! -- Sometimes, people with depression think of hurting or killing themselves. If you ever feel like you might hurt yourself, help is available:  ?In the , contact the [employer] Suicide & Crisis Lifeline:  To speak to someone, call or text [employer].  To talk to someone online, go to www.[employer]/chat.  ?Call your doctor or nurse and tell them that it is an emergency.  ?Call for an ambulance (in the [place] and [place], call 9-1-1).  ?Go to the emergency department at your local hospital.  If you think your partner might have depression, or if you are worried that they might hurt themselves, get them help right away.  How does counseling work? -- A counselor can help you figure out:  ?What triggers you to want to smoke, and how to handle these situations  ?How to resist cravings  ?What you can do differently if you have tried to quit before  You can meet with a counselor in 1-on-1 sessions or as part of a group. There are other ways to get counseling, too, such as over the phone, through text messaging, or online.  How do medicines help you stop smoking? -- Different medicines work in different ways:  ?Nicotine replacement therapy - Nicotine is the  "main drug in cigarettes, and the reason they are addictive. These medicines reduce your body's craving for nicotine. They also help with withdrawal symptoms.  There are different forms of nicotine replacement, including skin patches, lozenges, gum, nasal sprays, and inhalers. Most can be bought without a prescription. Also, health insurance might cover some or all of the cost.  It often helps to use 2 forms of nicotine replacement. For example, you might wear a patch all the time, plus use gum or lozenges when you get a craving to smoke.  ?Varenicline - Varenicline (brand names: Chantix, Champix) is a prescription medicine that reduces withdrawal symptoms and cigarette cravings. Varenicline can increase the effects of alcohol in some people. It's a good idea to limit drinking while you're taking it, at least until you know how it affects you.  Even if you are not yet ready to commit to a quit date, varenicline can help reduce cravings. This can make it easier to quit when you are ready.  ?Bupropion - Bupropion (sample brand names: Wellbutrin, Zyban) is a prescription medicine that reduces your desire to smoke. It is also available in a generic version, which is cheaper than the brand name medicines. Doctors do not usually prescribe bupropion for people with seizures or who have had seizures in the past.  It might also be helpful to combine nicotine replacement with bupropion or varenicline. In some cases, a person might even take both bupropion and varenicline. Your doctor or nurse can help you figure out the best combination for you.  What about e-cigarettes? -- Sometimes people wonder if using electronic cigarettes, or \"e-cigarettes,\" can help them quit smoking. Using e-cigarettes is also called \"vaping.\" Doctors do not recommend e-cigarettes in place of using of medicines and counseling. That's because e-cigarettes still contain nicotine as well as other substances that might be harmful. It's also not clear how " they can affect a person's health in the long term.  What if I am pregnant and I smoke? -- If you are pregnant, it's really important for the health of your baby that you quit. Ask your doctor what options you have, and what is safest for your baby.  What if I have already smoked for a long time? -- The longer you have smoked, the higher your chances are of having health problems. But it is never too late to quit smoking. It helps your health even if you are older or have smoked for many years. The best thing you can do to lower your chance of having a health problem caused by smoking is to quit.  Will I gain weight if I quit? -- You might gain a few pounds. This can be frustrating for some people. But it's important to remember that you are improving your health by quitting smoking. You can help prevent gaining a lot of weight by staying active and eating a healthy diet.  What if I am not able to quit? -- If you don't quit on your first try, or if you quit but then start smoking again, don't give up hope. Lots of people have to try more than once before they are able to completely quit.  It might help to try to understand why quitting did not work. There might be something you can do differently when you try again. It can help to figure out which situations make you want to smoke, so you can avoid them.  What else can I do to improve my chances of quitting? -- You can:  ?Get regular exercise. Any type of physical activity, even gentle forms of movement, is good for your health. Physical activity can also help reduce stress.  ?Stay away from people who smoke and places that make you want to smoke. If people close to you smoke, ask them to quit with you or avoid smoking around you.  ?Carry gum, hard candy, or something to put in your mouth. If you get a craving for a cigarette, try 1 of these instead.  ?Don't give up, even if you start smoking again. It takes most people a few tries before they succeed.  All topics  are updated as new evidence becomes available and our peer review process is complete.

## 2024-05-02 LAB
ATRIAL RATE: 59 BPM
P AXIS: 62 DEGREES
PR INTERVAL: 143 MS
Q ONSET: 253 MS
QRS COUNT: 9 BEATS
QRS DURATION: 75 MS
QT INTERVAL: 433 MS
QTC CALCULATION(BAZETT): 429 MS
QTC FREDERICIA: 430 MS
R AXIS: 19 DEGREES
T AXIS: 46 DEGREES
T OFFSET: 470 MS
VENTRICULAR RATE: 59 BPM

## 2024-05-03 ENCOUNTER — LAB (OUTPATIENT)
Dept: LAB | Facility: LAB | Age: 54
End: 2024-05-03
Payer: COMMERCIAL

## 2024-05-03 DIAGNOSIS — I25.10 CORONARY ARTERY DISEASE INVOLVING NATIVE CORONARY ARTERY OF NATIVE HEART WITHOUT ANGINA PECTORIS: ICD-10-CM

## 2024-05-03 LAB
CHOLEST SERPL-MCNC: 161 MG/DL (ref 0–199)
CHOLESTEROL/HDL RATIO: 4.3
HDLC SERPL-MCNC: 37.1 MG/DL
LDLC SERPL CALC-MCNC: 71 MG/DL
NON HDL CHOLESTEROL: 124 MG/DL (ref 0–149)
TRIGL SERPL-MCNC: 267 MG/DL (ref 0–149)
VLDL: 53 MG/DL (ref 0–40)

## 2024-05-03 PROCEDURE — 80061 LIPID PANEL: CPT

## 2024-05-03 PROCEDURE — 36415 COLL VENOUS BLD VENIPUNCTURE: CPT

## 2024-05-10 ENCOUNTER — OFFICE VISIT (OUTPATIENT)
Dept: CARDIOLOGY | Facility: CLINIC | Age: 54
End: 2024-05-10
Payer: COMMERCIAL

## 2024-05-10 VITALS
DIASTOLIC BLOOD PRESSURE: 68 MMHG | SYSTOLIC BLOOD PRESSURE: 108 MMHG | HEIGHT: 69 IN | BODY MASS INDEX: 29.77 KG/M2 | WEIGHT: 201 LBS | HEART RATE: 70 BPM | OXYGEN SATURATION: 97 %

## 2024-05-10 DIAGNOSIS — I25.10 ASHD (ARTERIOSCLEROTIC HEART DISEASE): Primary | ICD-10-CM

## 2024-05-10 DIAGNOSIS — R07.9 CHEST PAIN, UNSPECIFIED TYPE: ICD-10-CM

## 2024-05-10 PROCEDURE — 1036F TOBACCO NON-USER: CPT | Performed by: PHYSICIAN ASSISTANT

## 2024-05-10 PROCEDURE — 99213 OFFICE O/P EST LOW 20 MIN: CPT | Performed by: PHYSICIAN ASSISTANT

## 2024-05-10 ASSESSMENT — ENCOUNTER SYMPTOMS
PALPITATIONS: 0
SHORTNESS OF BREATH: 0
ABDOMINAL PAIN: 0
ORTHOPNEA: 0
VOMITING: 0
FEVER: 0
DIARRHEA: 0
DYSURIA: 0
WHEEZING: 0
NAUSEA: 0
DYSPNEA ON EXERTION: 0
WEAKNESS: 0

## 2024-05-10 NOTE — PATIENT INSTRUCTIONS
Please continue your current medications.  If you have any change in your cardiorespiratory status please call the office right away.  We will arrange for your yearly visit with your cardiologist.

## 2024-05-10 NOTE — PROGRESS NOTES
Cardiology Follow Up  Chief Complaint:   Here for follow up of [facility].       History Of Present Illness:    Enrrique Mariano is a 54 y.o. male presenting for 6 month follow up.  H/o STEMI and cardiac arrest in 8/22 s/p OLGA LIDIA x2 to mid LAD, dyslipidemia, superficial vein thrombosis, GERD, depression/anxiety who presents for follow up.     Since 10/2023 and last visit with Jam Vieira, telling me that since his holter he had 1-2 episodes of brief palps, but otherwise no long episodes.  He denies any chest pain/pressure, heartburn.  He has been working out lately - rides a recumbent bike at home, walks on a treadmill (3.5mph, half an hour at a time), and has been lifting some weight (chest/lats/legs).  No LE edema, orthopnea, PND.  No dizziness/syncope.    ER note--patient declined admit  3-12-24: This is a 54-year-old patient known to Dr. Ha.  Was last seen middle February.  Last week started developing chest discomfort that was short-lived and that it would come and go but then had an episode where his left arm felt tingly and he had some diaphoresis.  Symptoms again were very brief.  By the time he drove himself to the ER he had no symptoms.  Troponins and EKG was negative.  Patient refused admission at that time and presents today for follow-up.  States that he has not had any further chest discomfort  5-10-24: This 54-year-old patient known to Dr. Ha.  Presents today for follow-up after heart catheterization.  Some residual bruising to the right radial aspect.  Was found to have 50 to 70% narrowing in OM 2 branch but otherwise had an LAD stent that was patent.  No other obstructive disease.  He has been continued on medical therapy and has had no further episodes of chest pain.  He has been working on his diet and most recent LDL cholesterol is down to 71.     Last Recorded Vitals:  Vitals:    05/10/24 1352   BP: 108/68   BP Location: Right arm   Patient Position: Sitting   BP Cuff Size: Adult   Pulse: 70   SpO2:  "97%   Weight: 91.2 kg (201 lb)   Height: 1.753 m (5' 9\")       Past Medical History:  He has a past medical history of Coronary artery disease, Other conditions influencing health status, and Other iron deficiency anemias (09/15/2022).    Past Surgical History:  He has a past surgical history that includes Knee surgery (11/23/2016); Hernia repair (11/23/2016); Back surgery (11/23/2016); Other surgical history (09/08/2022); Cardiac catheterization; Coronary angioplasty; and Cardiac catheterization (N/A, 5/1/2024).      Social History:  He reports that he has quit smoking. His smoking use included cigarettes. He has never used smokeless tobacco. He reports that he does not currently use alcohol. He reports that he does not use drugs.    Family History:  No family history on file.     Allergies:  Patient has no known allergies.    Outpatient Medications:  Current Outpatient Medications   Medication Instructions    aspirin 81 mg, oral, Daily, CHEW AND SWALLOW    atorvastatin (LIPITOR) 80 mg, oral, Nightly    Entresto 24-26 mg tablet 1 tablet, oral, 2 times daily    melatonin 3 mg capsule oral    metoprolol succinate XL (TOPROL-XL) 100 mg, oral, Daily    omeprazole (PriLOSEC) 20 mg DR capsule 1 capsule, oral, Daily    ticagrelor (BRILINTA) 60 mg, oral, 2 times daily    Vascepa 2 g, oral, 2 times daily     Review of Systems   Constitutional: Negative for fever and malaise/fatigue.   Cardiovascular:  Negative for chest pain, dyspnea on exertion, orthopnea and palpitations.   Respiratory:  Negative for shortness of breath and wheezing.    Skin:  Negative for itching and rash.   Gastrointestinal:  Negative for abdominal pain, diarrhea, nausea and vomiting.   Genitourinary:  Negative for dysuria.   Neurological:  Negative for weakness.      Physical Exam  Constitutional:       General: He is not in acute distress.     Appearance: Normal appearance.   HENT:      Mouth/Throat:      Mouth: Mucous membranes are moist.   Neck:     "  Comments: No JVD  Cardiovascular:      Rate and Rhythm: Normal rate and regular rhythm.      Heart sounds: Normal heart sounds. No murmur heard.  Pulmonary:      Effort: Pulmonary effort is normal.      Breath sounds: Normal breath sounds.   Abdominal:      General: Abdomen is flat. Bowel sounds are normal.      Palpations: Abdomen is soft.   Musculoskeletal:         General: No swelling.   Skin:     General: Skin is warm and dry.      Comments: Resolving bruising to R forearm   Neurological:      Mental Status: He is alert and oriented to person, place, and time.   Psychiatric:         Mood and Affect: Mood normal.         Last Labs:  CBC -  Lab Results   Component Value Date    WBC 7.7 04/20/2024    HGB 14.5 04/20/2024    HCT 43.9 04/20/2024    MCV 96 04/20/2024     04/20/2024       CMP -  Lab Results   Component Value Date    CALCIUM 9.0 04/20/2024    PHOS 3.2 08/29/2022    PROT 7.3 03/07/2024    ALBUMIN 4.4 03/07/2024    AST 26 03/07/2024    ALT 54 (H) 03/07/2024    ALKPHOS 46 03/07/2024    BILITOT 0.5 03/07/2024       LIPID PANEL -   Lab Results   Component Value Date    CHOL 161 05/03/2024    TRIG 267 (H) 05/03/2024    HDL 37.1 05/03/2024    CHHDL 4.3 05/03/2024    LDLF 94 09/16/2023    VLDL 53 (H) 05/03/2024    NHDL 124 05/03/2024       RENAL FUNCTION PANEL -   Lab Results   Component Value Date    GLUCOSE 106 (H) 04/20/2024     04/20/2024    K 4.5 04/20/2024     04/20/2024    CO2 28 04/20/2024    ANIONGAP 10 04/20/2024    BUN 17 04/20/2024    CREATININE 1.03 04/20/2024    GFRMALE >90 09/16/2023    CALCIUM 9.0 04/20/2024    PHOS 3.2 08/29/2022    ALBUMIN 4.4 03/07/2024        Lab Results   Component Value Date    BNP 34 03/07/2024       Last Cardiology Tests:    Echo: 2022--CONCLUSIONS:   1. Left ventricular systolic function is low normal with a 45% estimated ejection fraction.   2. There is global hypokinesis of the left ventricle with minor regional variations.     Stress Test:  Nuclear  Stress Test 10/24/2022--IMPRESSION:  Medium-sized area of fixed perfusion defect of the apical, apical and  mid anterior, apical and mid anteroseptal segments, consistent with  myocardial scar or hibernating myocardium in the LAD territory.  Normal left ventricular systolic function on post stress gated  imaging.    [facility] 2024:  CONCLUSIONS:   1. The 1st obtuse marginal branch showed moderate mid-segment atherosclerotic disease.   2. Left dominant circulation. Mild to moderate nonobstructive disease.   3. The OM2 to demonstrated ~ 50-70% proximal to mid stenosis - ongoing medical therapy is recommended for this.   4. The LAD demonstrated initial catheter-induced spasm, which improved on subsequent angiography. There is a patent mid LAD stent without significant ISR.   5. The RCA is nondominant and demonstrates moderate mid atherosclerotic disease.   6. No significant LV-AO peak to peak pullback gradient.   7. Left Ventricular end-diastolic pressure = 9.        Lab review: I have personally reviewed the laboratory result(s)    Assessment/Plan   Problem List Items Addressed This Visit             ICD-10-CM       Cardiac and Vasculature    ASHD (arteriosclerotic heart disease) - Primary I25.10    Chest pain R07.9   ASHD--heart Cath results as noted.  Was noted to have disease and a smaller OM 2 branch and medical therapy was recommended.  With current medical therapy no recurrent chest pain.  His LDL cholesterol is down to 71 and blood pressure is well-controlled.  If patient has any change in his overall condition he is instructed to contact the office otherwise he is scheduled back with Dr. Ha in September.  Atypical chest discomfort--this is resolved on medical therapy.  Blood pressures are well-controlled we will continue current medical therapy.    History of mild LV dysfunction--no signs or symptoms of CHF on physical examination.    Hyperlipidemia --continue Vascepa and high intensity dose atorvastatin.  His  LDL cholesterol is down to 71.  = continue medical therapy as blood pressures are well-controlled he has no signs or symptoms of CHF.  Overall this patient is doing very well with no recurrence of symptoms.  Seems reassured by going over the heart cath results and how he is doing on medical therapy.  If there is any significant change call the office otherwise he is scheduled already back with Dr. Ha in September.    Asa Vieira PA-C  5/10/2024  2:08 PM

## 2024-05-30 ENCOUNTER — TELEPHONE (OUTPATIENT)
Dept: CARDIOLOGY | Facility: CLINIC | Age: 54
End: 2024-05-30
Payer: COMMERCIAL

## 2024-05-30 NOTE — TELEPHONE ENCOUNTER
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

## 2024-07-29 DIAGNOSIS — I47.29 NSVT (NONSUSTAINED VENTRICULAR TACHYCARDIA) (MULTI): ICD-10-CM

## 2024-07-29 DIAGNOSIS — I25.10 ASHD (ARTERIOSCLEROTIC HEART DISEASE): ICD-10-CM

## 2024-07-29 DIAGNOSIS — I25.10 CORONARY ARTERY DISEASE INVOLVING NATIVE CORONARY ARTERY OF NATIVE HEART, UNSPECIFIED WHETHER ANGINA PRESENT: ICD-10-CM

## 2024-07-29 DIAGNOSIS — I51.9 LV DYSFUNCTION: ICD-10-CM

## 2024-07-29 RX ORDER — ATORVASTATIN CALCIUM 80 MG/1
80 TABLET, FILM COATED ORAL NIGHTLY
Qty: 90 TABLET | Refills: 3 | Status: SHIPPED | OUTPATIENT
Start: 2024-07-29 | End: 2025-07-29

## 2024-07-29 NOTE — TELEPHONE ENCOUNTER
atorvastatin (Lipitor) 80 mg tablet     Take 1 tablet (80 mg) by mouth once daily at bedtime     [id number] - [facility] [address] [address] [address]  Phone: [phone]  Fax: [phone]  DARWIN #: --

## 2024-08-13 ENCOUNTER — APPOINTMENT (OUTPATIENT)
Dept: CARDIOLOGY | Facility: CLINIC | Age: 54
End: 2024-08-13
Payer: COMMERCIAL

## 2024-09-24 ENCOUNTER — APPOINTMENT (OUTPATIENT)
Dept: CARDIOLOGY | Facility: CLINIC | Age: 54
End: 2024-09-24
Payer: COMMERCIAL

## 2024-09-24 VITALS
BODY MASS INDEX: 28.73 KG/M2 | HEART RATE: 69 BPM | SYSTOLIC BLOOD PRESSURE: 108 MMHG | DIASTOLIC BLOOD PRESSURE: 72 MMHG | HEIGHT: 69 IN | WEIGHT: 194 LBS

## 2024-09-24 DIAGNOSIS — I51.9 LV DYSFUNCTION: Primary | ICD-10-CM

## 2024-09-24 DIAGNOSIS — I47.29 NSVT (NONSUSTAINED VENTRICULAR TACHYCARDIA) (MULTI): ICD-10-CM

## 2024-09-24 DIAGNOSIS — E78.1 HYPERTRIGLYCERIDEMIA: ICD-10-CM

## 2024-09-24 DIAGNOSIS — I25.10 ASHD (ARTERIOSCLEROTIC HEART DISEASE): ICD-10-CM

## 2024-09-24 PROCEDURE — 99214 OFFICE O/P EST MOD 30 MIN: CPT | Performed by: INTERNAL MEDICINE

## 2024-09-24 PROCEDURE — 1036F TOBACCO NON-USER: CPT | Performed by: INTERNAL MEDICINE

## 2024-09-24 PROCEDURE — 3008F BODY MASS INDEX DOCD: CPT | Performed by: INTERNAL MEDICINE

## 2024-09-24 PROCEDURE — 93010 ELECTROCARDIOGRAM REPORT: CPT | Performed by: INTERNAL MEDICINE

## 2024-09-24 PROCEDURE — 93005 ELECTROCARDIOGRAM TRACING: CPT | Performed by: INTERNAL MEDICINE

## 2024-09-24 NOTE — PROGRESS NOTES
Chief Complaint:   No chief complaint on file.     History Of Present Illness:    Enrrique Mariano is a 54 y.o. male presenting for yearly office visit.   H/o STEMI and cardiac arrest in 8/22 s/p OLGA LIDIA x2 to mid LAD, dyslipidemia, superficial vein thrombosis, GERD, depression/anxiety     Doing well from CV perspective.  No chest pain/pressure, SOB, LE edema, palps, LH/dizziness, presyncope.  He works a sedentary job, but when he had been working out, but this has fallen off lately.      ROS:  The remainder of the review of systems was obtained, as was negative as pertains to the chief complaint.     CV testing reviewed :  Labs 5/2024  chol 161  HDL 37.1  LDL 71  trig 267  K 4.5  BUN 17  crea 1.03  3/2024  ast 26  alt 54  LHC 5/2024  CONCLUSIONS:   1. The 1st obtuse marginal branch showed moderate mid-segment atherosclerotic disease.   2. Left dominant circulation. Mild to moderate nonobstructive disease.   3. The OM2 to demonstrated ~ 50-70% proximal to mid stenosis - ongoing medical therapy is recommended for this.   4. The LAD demonstrated initial catheter-induced spasm, which improved on subsequent angiography. There is a patent mid LAD stent without significant ISR.   5. The RCA is nondominant and demonstrates moderate mid atherosclerotic disease.   6. No significant LV-AO peak to peak pullback gradient.   7. Left Ventricular end-diastolic pressure = 9.  9/2023  Lipid labs chol 167, HDL 47.7, LDL 94, trig 128 BMP  K 4.4  BUN 20  crea 0.97  alt 25    ast 17     Stress echo 3/2024  positive for moderate ischemia suggestive of LAD CAD.  Holter monitor worn from 8/24-9/7/23 Predominant underlying rhythm was SR.  Min HR 47 max 179 av 73.  Three SVT runs occurred.  Fastest 6 beats max rate of 179 longest 11 beats av . SVE's Ve's <1%.     Stress MPI 10/22   IMPRESSION:  Medium-sized area of fixed perfusion defect of the apical, apical and  mid anterior, apical and mid anteroseptal segments, consistent  with  myocardial scar or hibernating myocardium in the LAD territory.  Normal left ventricular systolic function on post stress gated  imaging.     TTE 9/2022 EF 45% global hypokineses of LV trace pulmonic valve regurg      LHC  8/2022  OLGA LIDIA x2 of mid LAD      Last Recorded Vitals:  There were no vitals filed for this visit.    Past Medical History:  He has a past medical history of Coronary artery disease, Other conditions influencing health status, and Other iron deficiency anemias (09/15/2022).    Past Surgical History:  He has a past surgical history that includes Knee surgery (11/23/2016); Hernia repair (11/23/2016); Back surgery (11/23/2016); Other surgical history (09/08/2022); Cardiac catheterization; Coronary angioplasty; and Cardiac catheterization (N/A, 5/1/2024).      Social History:  He reports that he has quit smoking. His smoking use included cigarettes. He has never used smokeless tobacco. He reports that he does not currently use alcohol. He reports that he does not use drugs.    Family History:  No family history on file.     Allergies:  Patient has no known allergies.    Outpatient Medications:  Current Outpatient Medications   Medication Instructions    aspirin 81 mg, oral, Daily, CHEW AND SWALLOW    atorvastatin (LIPITOR) 80 mg, oral, Nightly    Entresto 24-26 mg tablet 1 tablet, oral, 2 times daily    melatonin 3 mg capsule oral    metoprolol succinate XL (TOPROL-XL) 100 mg, oral, Daily    omeprazole (PriLOSEC) 20 mg DR capsule 1 capsule, oral, Daily    ticagrelor (BRILINTA) 60 mg, oral, 2 times daily    Vascepa 2 g, oral, 2 times daily       Physical Exam:  Physical Exam  HENT:      Head: Normocephalic.      Nose: Nose normal.      Mouth/Throat:      Mouth: Mucous membranes are moist.   Cardiovascular:      Rate and Rhythm: Normal rate and regular rhythm.      Heart sounds: Normal heart sounds.      Comments: No leg swelling   Pulmonary:      Effort: Pulmonary effort is normal.      Breath  sounds: Normal breath sounds.   Abdominal:      Palpations: Abdomen is soft.   Musculoskeletal:         General: Normal range of motion.      Cervical back: Normal range of motion.   Skin:     General: Skin is warm and dry.   Neurological:      General: No focal deficit present.      Mental Status: He is alert.   Psychiatric:         Mood and Affect: Mood normal.            Last Labs:  CBC -  Lab Results   Component Value Date    WBC 7.7 04/20/2024    HGB 14.5 04/20/2024    HCT 43.9 04/20/2024    MCV 96 04/20/2024     04/20/2024       CMP -  Lab Results   Component Value Date    CALCIUM 9.0 04/20/2024    PHOS 3.2 08/29/2022    PROT 7.3 03/07/2024    ALBUMIN 4.4 03/07/2024    AST 26 03/07/2024    ALT 54 (H) 03/07/2024    ALKPHOS 46 03/07/2024    BILITOT 0.5 03/07/2024       LIPID PANEL -   Lab Results   Component Value Date    CHOL 161 05/03/2024    TRIG 267 (H) 05/03/2024    HDL 37.1 05/03/2024    CHHDL 4.3 05/03/2024    LDLF 94 09/16/2023    VLDL 53 (H) 05/03/2024    NHDL 124 05/03/2024       RENAL FUNCTION PANEL -   Lab Results   Component Value Date    GLUCOSE 106 (H) 04/20/2024     04/20/2024    K 4.5 04/20/2024     04/20/2024    CO2 28 04/20/2024    ANIONGAP 10 04/20/2024    BUN 17 04/20/2024    CREATININE 1.03 04/20/2024    GFRMALE >90 09/16/2023    CALCIUM 9.0 04/20/2024    PHOS 3.2 08/29/2022    ALBUMIN 4.4 03/07/2024        Lab Results   Component Value Date    BNP 34 03/07/2024         Assessment/Plan   No chest pain/pressure.  Doing extremely well  Did see EP Dr. Perera post 30 day monitor, which demonstrated NSVT - given EF 45% and only nonsustained events, no recommendation was made for ICD.      Recs:  -continue ASA 81mg daily  -he is on ticagrelor 60mg bid - keep on for now, eventually we will stop this  -metop succinate to 100mg daily  -high intensity statin therapy - atorva 80  -did not tolerate ezetimibe  -continue vascepa for high TG's  -tolerating entresto 24-26mg bid, BMP  9/2023 reviewed and K/Cr WNL  -discussed rebekah-mediterranean style diet  -continue with regular mod intensity exercise  -aggressive secondary prevention  -he has been able to abstain from tobacco use

## 2024-09-24 NOTE — PATIENT INSTRUCTIONS
Thanks for following up in office today.    1)  I want you to continue the brilinta for now.    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     3)  Please continue your cardiac medications as prescribed.    Follow up with Nimisha DEJESUS in 6  months  If you have any questions, please call [phone] and choose option for Dr. Ha's nurse Beth Deluna

## 2024-12-06 ENCOUNTER — APPOINTMENT (OUTPATIENT)
Dept: RADIOLOGY | Facility: HOSPITAL | Age: 54
End: 2024-12-06
Payer: COMMERCIAL

## 2024-12-06 ENCOUNTER — HOSPITAL ENCOUNTER (EMERGENCY)
Facility: HOSPITAL | Age: 54
Discharge: HOME | End: 2024-12-06
Payer: COMMERCIAL

## 2024-12-06 VITALS
OXYGEN SATURATION: 97 % | WEIGHT: 185 LBS | HEART RATE: 59 BPM | HEIGHT: 69 IN | TEMPERATURE: 97.7 F | DIASTOLIC BLOOD PRESSURE: 75 MMHG | BODY MASS INDEX: 27.4 KG/M2 | SYSTOLIC BLOOD PRESSURE: 119 MMHG | RESPIRATION RATE: 18 BRPM

## 2024-12-06 DIAGNOSIS — S76.301A RIGHT HAMSTRING INJURY, INITIAL ENCOUNTER: Primary | ICD-10-CM

## 2024-12-06 PROCEDURE — 73564 X-RAY EXAM KNEE 4 OR MORE: CPT | Mod: RIGHT SIDE | Performed by: RADIOLOGY

## 2024-12-06 PROCEDURE — 99283 EMERGENCY DEPT VISIT LOW MDM: CPT

## 2024-12-06 PROCEDURE — 73564 X-RAY EXAM KNEE 4 OR MORE: CPT | Mod: RT

## 2024-12-06 RX ORDER — MELOXICAM 15 MG/1
15 TABLET ORAL DAILY
Qty: 10 TABLET | Refills: 0 | Status: SHIPPED | OUTPATIENT
Start: 2024-12-06

## 2024-12-06 ASSESSMENT — PAIN SCALES - GENERAL: PAINLEVEL_OUTOF10: 10 - WORST POSSIBLE PAIN

## 2024-12-06 ASSESSMENT — PAIN DESCRIPTION - LOCATION: LOCATION: KNEE

## 2024-12-06 ASSESSMENT — PAIN - FUNCTIONAL ASSESSMENT: PAIN_FUNCTIONAL_ASSESSMENT: 0-10

## 2024-12-06 NOTE — ED PROVIDER NOTES
"Chief Complaint   Patient presents with    Knee Pain     Pt heard a pop last night in his knee. Pt heard a pop again today and has been unable to bear weight since.        HPI       54 year old male presents to the Emergency Department today complaining of bending down last evening and felt a \"pop\" in the right lateral posterior knee. Reports that he felt an additional \"pop\" when attempting to straighten up last night. Reports that he was feeing well until he bent over again this evening. Notes to having increased pain with any type of bending.       History provided by:  Patient             Patient History   Past Medical History:   Diagnosis Date    Coronary artery disease     Other conditions influencing health status     No significant past medical history    Other iron deficiency anemias 09/15/2022    Other iron deficiency anemia     Past Surgical History:   Procedure Laterality Date    BACK SURGERY  11/23/2016    Back Surgery    CARDIAC CATHETERIZATION      CARDIAC CATHETERIZATION N/A 5/1/2024    Procedure: Left Heart Cath;  Surgeon: Francisco Ha MD;  Location: [facility] Cardiac Cath Lab;  Service: Cardiovascular;  Laterality: N/A;    CORONARY ANGIOPLASTY      HERNIA REPAIR  11/23/2016    Hernia Repair    KNEE SURGERY  11/23/2016    Knee Surgery    OTHER SURGICAL HISTORY  09/08/2022    Cardiac catheterization with stent placement     No family history on file.  Social History     Tobacco Use    Smoking status: Former     Types: Cigarettes    Smokeless tobacco: Never   Substance Use Topics    Alcohol use: Not Currently    Drug use: Never           Physical Exam  Constitutional:       General: He is awake.      Appearance: Normal appearance.   Cardiovascular:      Rate and Rhythm: Normal rate and regular rhythm.      Pulses:           Radial pulses are 3+ on the right side and 3+ on the left side.      Heart sounds: Normal heart sounds. No murmur heard.     No friction rub. No gallop.   Pulmonary:      Effort: " Pulmonary effort is normal.      Breath sounds: Normal breath sounds and air entry.   Musculoskeletal:      Comments: No obvious deformity, ecchymosis, edema, or bony tenderness noted to the right knee. There is a deformity to the lateral hamstring muscles and proximal tendon. Limited ROM related to pain. Negative anterior/posterior drawer, Walter's test, and varus/valgus strain. Right posterior tibialis pulse is strong and regular. Sensation is intact distally.    Neurological:      Mental Status: He is alert.   Psychiatric:         Behavior: Behavior is cooperative.         Labs Reviewed - No data to display    XR knee right 4+ views   Final Result   No acute fracture or dislocation of the right knee.        Minimal knee effusion.        MACRO:   None        Signed by: Tomy Gerardo 12/6/2024 7:42 PM   Dictation workstation:   [id number]               ED Course & MDM   Diagnoses as of 12/06/24 2124   Right hamstring injury, initial encounter           Medical Decision Making  Patient was seen and evaluated by myself. Right knee x-ray shows no acute fracture or dislocation of the right knee. Instructed to ice and elevate the sore area as much as possible. Given a prescription for Mobic. No contraindications to NSAIDs are noted. Placed in a knee immobilizer by staff. Capillary refill was within normal limits and sensation was intact distally post-application. Given crutches and to be weight bearing as tolerated. Given orthopedics for follow up. Return to the ED if worse in any way. Discharged in stable condition with computer instructions.     Diagnostic Impression:    1. Acute right hamstring injury    2. Prescription therapy            Your medication list        ASK your doctor about these medications        Instructions Last Dose Given Next Dose Due   aspirin 81 mg chewable tablet      Chew 1 tablet (81 mg) once daily. CHEW AND SWALLOW       atorvastatin 80 mg tablet  Commonly known as: Lipitor      Take 1  tablet (80 mg) by mouth once daily at bedtime.       Entresto 24-26 mg tablet  Generic drug: sacubitriL-valsartan      Take 1 tablet by mouth 2 times a day.       melatonin 3 mg capsule           metoprolol succinate  mg 24 hr tablet  Commonly known as: Toprol-XL      Take 1 tablet (100 mg) by mouth once daily.       omeprazole 20 mg DR capsule  Commonly known as: PriLOSEC           ticagrelor 60 mg tablet  Commonly known as: Brilinta      Take 1 tablet (60 mg) by mouth 2 times a day.       Vascepa 1 gram capsule  Generic drug: icosapent ethyL      Take 2 capsules (2 g) by mouth 2 times a day.                  Procedure  Procedures     Gaston Welch, ESTELA-CNP  12/06/24 2128

## 2024-12-12 ENCOUNTER — APPOINTMENT (OUTPATIENT)
Dept: ORTHOPEDIC SURGERY | Facility: HOSPITAL | Age: 54
End: 2024-12-12
Payer: COMMERCIAL

## 2024-12-17 ENCOUNTER — OFFICE VISIT (OUTPATIENT)
Dept: CARDIOLOGY | Facility: HOSPITAL | Age: 54
End: 2024-12-17
Payer: COMMERCIAL

## 2024-12-17 VITALS
DIASTOLIC BLOOD PRESSURE: 72 MMHG | HEART RATE: 67 BPM | SYSTOLIC BLOOD PRESSURE: 120 MMHG | WEIGHT: 192 LBS | BODY MASS INDEX: 28.44 KG/M2 | HEIGHT: 69 IN | OXYGEN SATURATION: 99 %

## 2024-12-17 DIAGNOSIS — I47.29 NSVT (NONSUSTAINED VENTRICULAR TACHYCARDIA) (MULTI): ICD-10-CM

## 2024-12-17 DIAGNOSIS — Z01.818 PREOP EXAMINATION: ICD-10-CM

## 2024-12-17 DIAGNOSIS — I10 ESSENTIAL HYPERTENSION: ICD-10-CM

## 2024-12-17 DIAGNOSIS — F17.200 TOBACCO USE DISORDER: ICD-10-CM

## 2024-12-17 DIAGNOSIS — Z01.818 PRE-OP EXAM: Primary | ICD-10-CM

## 2024-12-17 DIAGNOSIS — I25.10 CORONARY ARTERY DISEASE INVOLVING NATIVE CORONARY ARTERY OF NATIVE HEART, UNSPECIFIED WHETHER ANGINA PRESENT: ICD-10-CM

## 2024-12-17 LAB
ATRIAL RATE: 68 BPM
P AXIS: 40 DEGREES
P OFFSET: 201 MS
P ONSET: 172 MS
PR INTERVAL: 100 MS
Q ONSET: 222 MS
QRS COUNT: 11 BEATS
QRS DURATION: 64 MS
QT INTERVAL: 410 MS
QTC CALCULATION(BAZETT): 435 MS
QTC FREDERICIA: 427 MS
R AXIS: -6 DEGREES
T AXIS: 26 DEGREES
T OFFSET: 427 MS
VENTRICULAR RATE: 68 BPM

## 2024-12-17 PROCEDURE — 3008F BODY MASS INDEX DOCD: CPT | Performed by: NURSE PRACTITIONER

## 2024-12-17 PROCEDURE — 3074F SYST BP LT 130 MM HG: CPT | Performed by: NURSE PRACTITIONER

## 2024-12-17 PROCEDURE — 3078F DIAST BP <80 MM HG: CPT | Performed by: NURSE PRACTITIONER

## 2024-12-17 PROCEDURE — 1036F TOBACCO NON-USER: CPT | Performed by: NURSE PRACTITIONER

## 2024-12-17 PROCEDURE — 99214 OFFICE O/P EST MOD 30 MIN: CPT | Mod: 25 | Performed by: NURSE PRACTITIONER

## 2024-12-17 PROCEDURE — 93005 ELECTROCARDIOGRAM TRACING: CPT | Performed by: NURSE PRACTITIONER

## 2024-12-17 PROCEDURE — 99214 OFFICE O/P EST MOD 30 MIN: CPT | Performed by: NURSE PRACTITIONER

## 2024-12-17 RX ORDER — METOPROLOL SUCCINATE 100 MG/1
100 TABLET, EXTENDED RELEASE ORAL DAILY
Qty: 90 TABLET | Refills: 3 | Status: SHIPPED | OUTPATIENT
Start: 2024-12-17 | End: 2025-12-17

## 2024-12-17 ASSESSMENT — ENCOUNTER SYMPTOMS
DEPRESSION: 0
SYNCOPE: 0
PALPITATIONS: 0
IRREGULAR HEARTBEAT: 0
DYSPNEA ON EXERTION: 0
RESPIRATORY NEGATIVE: 1
ORTHOPNEA: 0
DECREASED APPETITE: 0
BLURRED VISION: 0
GASTROINTESTINAL NEGATIVE: 1
FALLS: 0
COUGH: 0
CONSTITUTIONAL NEGATIVE: 1
MEMORY LOSS: 0
FOCAL WEAKNESS: 0
LIGHT-HEADEDNESS: 0
SHORTNESS OF BREATH: 0

## 2024-12-17 NOTE — PATIENT INSTRUCTIONS
Continue on current meds  Heart healthy, low sodium diet  Mediterranean diet is recommended  Morning of surgery take your Metoprolol with sip of water  Hold Brilinta as recommended by surgeon.  Continue on Aspirin.  Follow up with OLEG Torres in March as planned.

## 2024-12-17 NOTE — PROGRESS NOTES
Chief Complaint/Reason for Visit:   Presurgical cardiovascular risk assessment     History Of Present Illness:    Mr. Mariano he is coming today as a presurgical cardiovascular risk assessment.  He is scheduled for a right knee scope with lateral meniscectomy and chondroplasty on December 23, 2024 under general anesthesia (Dr. Benjamín Dill).  We have followed this patient previously for a STEMI with cardiac arrest in August 2022 requiring OLGA LIDIA x 2 to the mid LAD, dyslipidemia, and NSVT.  He also has a history of GERD, depression/anxiety, and superficial vein thrombosis. Heart catheterization done May 1, 2024 showed moderate disease OM1, moderate disease in the OM 2, mid LAD D stent without significant in-stent restenosis, moderate disease in the mid RCA.  Medical therapy was recommended.    Patient's trinidad ejection fraction noted during his MI was 40-45% which has recovered based on stress echo in March of this year to 55-60%.    Patient comes in today feeling well.  He has been exercising routinely without any cardiac complaints.  Patient denies any chest pain, pressure, palpitations, orthopnea, or edema.  Patient is taking his medication as prescribed.  His knee injury occurred after bending down and feeling a pop.    Past Medical History:  He has a past medical history of Coronary artery disease, Other conditions influencing health status, and Other iron deficiency anemias (09/15/2022).    Past Surgical History:  He has a past surgical history that includes Knee surgery (11/23/2016); Hernia repair (11/23/2016); Back surgery (11/23/2016); Other surgical history (09/08/2022); Cardiac catheterization; Coronary angioplasty; and Cardiac catheterization (N/A, 5/1/2024).      Social History:  He reports that he has quit smoking. His smoking use included cigarettes. He has never used smokeless tobacco. He reports that he does not currently use alcohol. He reports that he does not use drugs.    Family History:  No family  "history on file.     Allergies:  Patient has no known allergies.    Medications:  Current Outpatient Medications   Medication Instructions    aspirin 81 mg, oral, Daily, CHEW AND SWALLOW    atorvastatin (LIPITOR) 80 mg, oral, Nightly    Entresto 24-26 mg tablet 1 tablet, oral, 2 times daily    melatonin 3 mg capsule Take by mouth.    meloxicam (MOBIC) 15 mg, oral, Daily    metoprolol succinate XL (TOPROL-XL) 100 mg, oral, Daily    omeprazole (PriLOSEC) 20 mg DR capsule 1 capsule, Daily    ticagrelor (BRILINTA) 60 mg, oral, 2 times daily    Vascepa 2 g, oral, 2 times daily       Review of Systems:  Review of Systems   Constitutional: Negative. Negative for decreased appetite and malaise/fatigue.   HENT: Negative.     Eyes:  Negative for blurred vision and visual disturbance.   Cardiovascular:  Negative for chest pain, dyspnea on exertion, irregular heartbeat, leg swelling, orthopnea, palpitations and syncope.   Respiratory: Negative.  Negative for cough and shortness of breath.    Musculoskeletal:  Positive for arthritis. Negative for falls.        R knee pain and needs surgery  Hx of OA   Gastrointestinal: Negative.    Neurological:  Negative for focal weakness and light-headedness.   Psychiatric/Behavioral:  Negative for depression and memory loss.         Vitals  Visit Vitals  /72 (BP Location: Right arm, Patient Position: Sitting, BP Cuff Size: Adult)   Pulse 67   Ht 1.753 m (5' 9\")   Wt 87.1 kg (192 lb)   SpO2 99%   BMI 28.35 kg/m²   Smoking Status Former   BSA 2.06 m²        Physical Exam:  Physical Exam  Constitutional:       Appearance: Normal appearance.   HENT:      Head: Normocephalic.   Eyes:      Conjunctiva/sclera: Conjunctivae normal.   Cardiovascular:      Rate and Rhythm: Normal rate and regular rhythm.      Pulses: Normal pulses.      Heart sounds: S1 normal and S2 normal. No murmur heard.     No friction rub. No gallop.   Pulmonary:      Effort: Pulmonary effort is normal.      Breath sounds: " Normal breath sounds.   Abdominal:      General: Bowel sounds are normal.      Palpations: Abdomen is soft.      Tenderness: There is no abdominal tenderness.   Musculoskeletal:      Cervical back: Neck supple.      Right lower leg: No edema.      Left lower leg: No edema.   Skin:     General: Skin is warm and dry.   Neurological:      General: No focal deficit present.      Mental Status: He is alert and oriented to person, place, and time.   Psychiatric:         Attention and Perception: Attention normal.         Mood and Affect: Mood normal.           Last Labs:  CBC -  Lab Results   Component Value Date    WBC 7.7 04/20/2024    HGB 14.5 04/20/2024    HCT 43.9 04/20/2024    MCV 96 04/20/2024     04/20/2024     Lab Results   Component Value Date    GLUCOSE 106 (H) 04/20/2024    CALCIUM 9.0 04/20/2024     04/20/2024    K 4.5 04/20/2024    CO2 28 04/20/2024     04/20/2024    BUN 17 04/20/2024    CREATININE 1.03 04/20/2024      CMP -  Lab Results   Component Value Date    CALCIUM 9.0 04/20/2024    PHOS 3.2 08/29/2022    PROT 7.3 03/07/2024    ALBUMIN 4.4 03/07/2024    AST 26 03/07/2024    ALT 54 (H) 03/07/2024    ALKPHOS 46 03/07/2024    BILITOT 0.5 03/07/2024       LIPID PANEL -   Lab Results   Component Value Date    CHOL 161 05/03/2024    TRIG 267 (H) 05/03/2024    HDL 37.1 05/03/2024    CHHDL 4.3 05/03/2024    LDLF 94 09/16/2023    VLDL 53 (H) 05/03/2024    NHDL 124 05/03/2024       Lab Results   Component Value Date    BNP 34 03/07/2024       Last Cardiology Tests:  ECG:  EKG done in the office today and personally reviewed shows sinus rhythm at 68 bpm, QT corrected interval 435 ms.  Septal infarct, age undetermined.  This goes to Dr. Ha for final review.    Echo:3-26-24  Summary:   1. The resting ejection fraction was estimated at 55 to 60% with a peak exercise ejection fraction estimated at 55 to 60%.   2. Normal global left ventricular systolic function.   3. Adequate level of stress  achieved.   4. At peak, there are stress-induced regional wall motion abnormalities.   5. Normal peak stress global LV systolic function.   6. This is a positive exercise stress echocardiogram for moderate ischemia, suggestive of left anterior descending coronary artery disease.       Cath:5-1-24  Recommendations:  Maximize medical therapy.  Agressive risk factor modification efforts.  Telemetry monitoring.  Follow-up with cardiology clinic.  Monitor vitals and arterial access site/pulses.  Lipid lowering agent or Statin therapy.  Maximize anti-anginal therapy, and GDMT for atherosclerotic disease.  If he develops recurrent angina on multiple anti-anginal agents, discuss  revascularization options.  Medical management of coronary artery disease.  Aspirin therapy.     ____________________________________________________________________________________  CONCLUSIONS:   1. The 1st obtuse marginal branch showed moderate mid-segment atherosclerotic disease.   2. Left dominant circulation. Mild to moderate nonobstructive disease.   3. The OM2 to demonstrated ~ 50-70% proximal to mid stenosis - ongoing medical therapy is recommended for this.   4. The LAD demonstrated initial catheter-induced spasm, which improved on subsequent angiography. There is a patent mid LAD stent without significant ISR.   5. The RCA is nondominant and demonstrates moderate mid atherosclerotic disease.   6. No significant LV-AO peak to peak pullback gradient.   7. Left Ventricular end-diastolic pressure = 9.      Lab review: I have personally reviewed the laboratory result(s)     Assessment/Plan:  No chest pain/pressure.  Doing extremely well  Did see EP Dr. Perera post 30 day monitor, which demonstrated NSVT - given EF 45% and only nonsustained events, no recommendation was made for ICD.      Recs:  -continue ASA 81mg daily  -he is on ticagrelor 60mg bid - keep on for now, eventually we will stop this  -metop succinate to 100mg daily  -high  intensity statin therapy - atorva 80  -did not tolerate ezetimibe  -continue vascepa for high TG's  -tolerating entresto 24-26mg bid, BMP 9/2023 reviewed and K/Cr WNL  -discussed rebekah-mediterranean style diet  -continue with regular mod intensity exercise  -aggressive secondary prevention  -he has been able to abstain from tobacco use        Preoperative cardiovascular risk stratification  The patient states that they are being evaluated for cardiac risk prior to right knee scope lateral meniscectomy and chondroplasty, Dr. Benjamín Dill at the [facility] on December 23, 2024.  The patient has history of: ischemic cardiovascular disease  No known history of LV systolic dysfunction, Ischemic cerebrovascular disease, Insulin-dependent diabetes mellitus , or Significant renal dysfunction with creatinine >2  The patient is not scheduled for a high risk surgery (intrathoracic; suprainguinal vascular or intraperitoneal)  RCRI score is 1  Patient has a history of coronary artery disease and PCI.  Recommend continuing aspirin 81 mg daily up to and including day of procedure without interruption.  I recommend that he resume his Brilinta postprocedure.  Recommend continuing beta blocker without interruption through perioperative period.  Overall, the patient is at low risk for perioperative MACE.  Patient expressed understanding of the risk for perioperative cardiovascular complications.  Patient appears to be optimized from a cardiovascular standpoint.  No cardiac workup is indicated prior to surgery.      ASHD: Patient had a STEMI with cardiac arrest in August 2022 and has OLGA LIDIA x 2 to the mid LAD.  Repeat heart catheterization done in May 2024  showed moderate disease OM1, moderate disease  OM 2, mid LAD stent without significant in-stent restenosis, moderate disease in the mid RCA.  Medical therapy was recommended.  Patient currently is on low-dose aspirin, low-dose Brilinta, atorvastatin, Vascepa, metoprolol succinate and  Entresto.    Hypertension: Patient will continue on Entresto and metoprolol.Blood pressure in the office today was 120/72.  Patient should be on a heart healthy low-sodium diet.    Tobacco use disorder: Quit in 2022.  Patient was congratulated and encouraged to remain tobacco free.    Patient will follow-up with OLEG Torres in March as planned.  Patient instructed to call with any cardiovascular complaints. All questions were answered.       Dragon dictation was utilized to create this document. Quite often unanticipated grammatical, syntax,  and other interpretive errors are inadvertently transcribed by the computer software.  Please disregard these errors.  Please excuse any errors that have escaped final proofreading.          Dorota Landry, ESTELA-CNP

## 2025-02-24 DIAGNOSIS — I25.10 ASHD (ARTERIOSCLEROTIC HEART DISEASE): ICD-10-CM

## 2025-02-24 DIAGNOSIS — I51.9 LV DYSFUNCTION: ICD-10-CM

## 2025-02-24 DIAGNOSIS — I47.29 NSVT (NONSUSTAINED VENTRICULAR TACHYCARDIA) (MULTI): ICD-10-CM

## 2025-02-24 DIAGNOSIS — I25.10 CORONARY ARTERY DISEASE INVOLVING NATIVE CORONARY ARTERY OF NATIVE HEART, UNSPECIFIED WHETHER ANGINA PRESENT: ICD-10-CM

## 2025-02-24 RX ORDER — ICOSAPENT ETHYL 1000 MG/1
2 CAPSULE ORAL 2 TIMES DAILY
Qty: 360 CAPSULE | Refills: 1 | Status: SHIPPED | OUTPATIENT
Start: 2025-02-24 | End: 2026-02-24

## 2025-02-25 ENCOUNTER — TELEPHONE (OUTPATIENT)
Dept: CARDIOLOGY | Facility: HOSPITAL | Age: 55
End: 2025-02-25
Payer: COMMERCIAL

## 2025-02-25 NOTE — TELEPHONE ENCOUNTER
RN called pt at this time to notify him that Vascepa was denied after PA was submitted through insurance. RN notified Jam DEJESUS and ian DEJESUS wants pt to start OTC Fish Oil at this time. Pt verbalized understanding.

## 2025-03-10 DIAGNOSIS — I47.29 NSVT (NONSUSTAINED VENTRICULAR TACHYCARDIA) (MULTI): ICD-10-CM

## 2025-03-10 DIAGNOSIS — I51.9 LV DYSFUNCTION: ICD-10-CM

## 2025-03-10 DIAGNOSIS — I25.10 CORONARY ARTERY DISEASE INVOLVING NATIVE CORONARY ARTERY OF NATIVE HEART, UNSPECIFIED WHETHER ANGINA PRESENT: ICD-10-CM

## 2025-03-10 DIAGNOSIS — I25.10 ASHD (ARTERIOSCLEROTIC HEART DISEASE): ICD-10-CM

## 2025-03-10 DIAGNOSIS — E78.1 HYPERTRIGLYCERIDEMIA: ICD-10-CM

## 2025-03-10 DIAGNOSIS — I51.9 LV DYSFUNCTION: Primary | ICD-10-CM

## 2025-03-10 RX ORDER — SACUBITRIL AND VALSARTAN 24; 26 MG/1; MG/1
1 TABLET, FILM COATED ORAL 2 TIMES DAILY
Qty: 120 TABLET | Refills: 0 | Status: SHIPPED | OUTPATIENT
Start: 2025-03-10 | End: 2026-03-10

## 2025-03-25 ENCOUNTER — OFFICE VISIT (OUTPATIENT)
Dept: CARDIOLOGY | Facility: HOSPITAL | Age: 55
End: 2025-03-25
Payer: COMMERCIAL

## 2025-03-25 VITALS
WEIGHT: 197 LBS | SYSTOLIC BLOOD PRESSURE: 96 MMHG | HEART RATE: 57 BPM | HEIGHT: 69 IN | DIASTOLIC BLOOD PRESSURE: 60 MMHG | OXYGEN SATURATION: 97 % | BODY MASS INDEX: 29.18 KG/M2

## 2025-03-25 DIAGNOSIS — I51.9 LV DYSFUNCTION: ICD-10-CM

## 2025-03-25 DIAGNOSIS — I47.29 NSVT (NONSUSTAINED VENTRICULAR TACHYCARDIA) (MULTI): ICD-10-CM

## 2025-03-25 DIAGNOSIS — I25.10 ASHD (ARTERIOSCLEROTIC HEART DISEASE): Primary | ICD-10-CM

## 2025-03-25 DIAGNOSIS — I25.10 CORONARY ARTERY DISEASE INVOLVING NATIVE CORONARY ARTERY OF NATIVE HEART, UNSPECIFIED WHETHER ANGINA PRESENT: ICD-10-CM

## 2025-03-25 DIAGNOSIS — E78.1 HYPERTRIGLYCERIDEMIA: ICD-10-CM

## 2025-03-25 DIAGNOSIS — R07.9 CHEST PAIN, UNSPECIFIED TYPE: ICD-10-CM

## 2025-03-25 PROCEDURE — 99213 OFFICE O/P EST LOW 20 MIN: CPT | Performed by: PHYSICIAN ASSISTANT

## 2025-03-25 PROCEDURE — 1036F TOBACCO NON-USER: CPT | Performed by: PHYSICIAN ASSISTANT

## 2025-03-25 PROCEDURE — 3008F BODY MASS INDEX DOCD: CPT | Performed by: PHYSICIAN ASSISTANT

## 2025-03-25 RX ORDER — METOPROLOL SUCCINATE 100 MG/1
100 TABLET, EXTENDED RELEASE ORAL DAILY
Qty: 90 TABLET | Refills: 3 | Status: SHIPPED | OUTPATIENT
Start: 2025-03-25 | End: 2026-03-25

## 2025-03-25 RX ORDER — SACUBITRIL AND VALSARTAN 24; 26 MG/1; MG/1
1 TABLET, FILM COATED ORAL 2 TIMES DAILY
Qty: 180 TABLET | Refills: 3 | Status: SHIPPED | OUTPATIENT
Start: 2025-03-25 | End: 2026-03-25

## 2025-03-25 RX ORDER — NAPROXEN SODIUM 220 MG/1
81 TABLET, FILM COATED ORAL DAILY
COMMUNITY
Start: 2025-03-25

## 2025-03-25 RX ORDER — ATORVASTATIN CALCIUM 80 MG/1
80 TABLET, FILM COATED ORAL NIGHTLY
Qty: 90 TABLET | Refills: 3 | Status: SHIPPED | OUTPATIENT
Start: 2025-03-25 | End: 2026-03-25

## 2025-03-25 ASSESSMENT — ENCOUNTER SYMPTOMS
VOMITING: 0
DIARRHEA: 0
FEVER: 0
WHEEZING: 0
DYSURIA: 0
SHORTNESS OF BREATH: 0
ORTHOPNEA: 0
ABDOMINAL PAIN: 0
DEPRESSION: 0
OCCASIONAL FEELINGS OF UNSTEADINESS: 0
WEAKNESS: 0
PALPITATIONS: 0
LOSS OF SENSATION IN FEET: 0
NAUSEA: 0

## 2025-03-25 NOTE — PATIENT INSTRUCTIONS
Please continue your current medications. Please get labs rechecked.    If you have any change in your cardiorespiratory status please call the office right away.  We will arrange for your yearly visit with your cardiologist--in September.

## 2025-03-25 NOTE — PROGRESS NOTES
Cardiology Follow Up  Chief Complaint:   Patient is here for 6 month office visit.      History Of Present Illness:    Enrrique Mariano is a 54 y.o. male presenting for yearly office visit.   H/o STEMI and cardiac arrest in 8/22 s/p OLGA LIDIA x2 to mid LAD, dyslipidemia, superficial vein thrombosis, GERD, depression/anxiety      Doing well from CV perspective.  No chest pain/pressure, SOB, LE edema, palps, LH/dizziness, presyncope.  He works a sedentary job, but when he had been working out, but this has fallen off lately.       ROS:  The remainder of the review of systems was obtained, as was negative as pertains to the chief complaint.      CV testing reviewed :  Labs 5/2024  chol 161  HDL 37.1  LDL 71  trig 267  K 4.5  BUN 17  crea 1.03  3/2024  ast 26  alt 54  LHC 5/2024  CONCLUSIONS:   1. The 1st obtuse marginal branch showed moderate mid-segment atherosclerotic disease.   2. Left dominant circulation. Mild to moderate nonobstructive disease.   3. The OM2 to demonstrated ~ 50-70% proximal to mid stenosis - ongoing medical therapy is recommended for this.   4. The LAD demonstrated initial catheter-induced spasm, which improved on subsequent angiography. There is a patent mid LAD stent without significant ISR.   5. The RCA is nondominant and demonstrates moderate mid atherosclerotic disease.   6. No significant LV-AO peak to peak pullback gradient.   7. Left Ventricular end-diastolic pressure = 9.  9/2023  Lipid labs chol 167, HDL 47.7, LDL 94, trig 128 BMP  K 4.4  BUN 20  crea 0.97  alt 25    ast 17     Stress echo 3/2024  positive for moderate ischemia suggestive of LAD CAD.  Holter monitor worn from 8/24-9/7/23 Predominant underlying rhythm was SR.  Min HR 47 max 179 av 73.  Three SVT runs occurred.  Fastest 6 beats max rate of 179 longest 11 beats av . SVE's Ve's <1%.     Stress MPI 10/22   IMPRESSION:  Medium-sized area of fixed perfusion defect of the apical, apical and  mid anterior, apical and mid  "anteroseptal segments, consistent with  myocardial scar or hibernating myocardium in the LAD territory.  Normal left ventricular systolic function on post stress gated  imaging.     TTE 2022 EF 45% global hypokineses of LV trace pulmonic valve regurg      LHC  2022  OLGA LIDIA x2 of mid LAD    3-25-25: Is a very pleasant 55-year-old patient known to Dr. Ha.  Above history as noted.  Has been doing very well from a cardiac standpoint.  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  Recently had a scope to the right knee due to torn meniscus and is still struggling with some tightness and discomfort.  He was off of his brilinta for the procedure and then subsequently his prescription  and he did not get a refill so he has been off of brilinta now for a couple of weeks with no chest pain shortness of breath or concerning cardiac symptoms.  He has been taking his other medication as prescribed including an over-the-counter fish oil supplement with his statin and he is also been using a product from [employer] called cholesterol support and he is due for follow-up labs.     Last Recorded Vitals:  Vitals:    25 1527   BP: 96/60   BP Location: Left arm   Pulse: 57   SpO2: 97%   Weight: 89.4 kg (197 lb)   Height: 1.753 m (5' 9\")       Past Medical History:  He has a past medical history of Coronary artery disease, Other conditions influencing health status, and Other iron deficiency anemias (09/15/2022).    Past Surgical History:  He has a past surgical history that includes Knee surgery (2016); Hernia repair (2016); Back surgery (2016); Other surgical history (2022); Cardiac catheterization; Coronary angioplasty; and Cardiac catheterization (N/A, 2024).      Social History:  He reports that he has quit smoking. His smoking use included cigarettes. He has never used smokeless tobacco. He reports that he does not currently use " alcohol. He reports that he does not use drugs.    Family History:  No family history on file.     Allergies:  Patient has no known allergies.    Outpatient Medications:  Current Outpatient Medications   Medication Instructions    aspirin 81 mg, oral, Daily    atorvastatin (LIPITOR) 80 mg, oral, Nightly    Entresto 24-26 mg tablet 1 tablet, oral, 2 times daily    melatonin 3 mg capsule Take by mouth.    metoprolol succinate XL (TOPROL-XL) 100 mg, oral, Daily    omeprazole (PriLOSEC) 20 mg DR capsule 1 capsule, Daily     Review of Systems   Constitutional: Negative for fever and malaise/fatigue.   Cardiovascular:  Negative for chest pain, orthopnea and palpitations.   Respiratory:  Negative for shortness of breath and wheezing.    Skin:  Negative for itching and rash.   Musculoskeletal:         Some pain and stiffness R knee as he needed scope for torn meniscus   Gastrointestinal:  Negative for abdominal pain, diarrhea, nausea and vomiting.   Genitourinary:  Negative for dysuria.   Neurological:  Negative for weakness.      Physical Exam  Constitutional:       General: He is not in acute distress.     Appearance: Normal appearance.   HENT:      Mouth/Throat:      Mouth: Mucous membranes are moist.   Neck:      Comments: No JVD or bruits  Cardiovascular:      Rate and Rhythm: Normal rate and regular rhythm.      Heart sounds: Normal heart sounds. No murmur heard.  Pulmonary:      Effort: Pulmonary effort is normal.      Breath sounds: Normal breath sounds.   Abdominal:      General: Abdomen is flat. Bowel sounds are normal.      Palpations: Abdomen is soft.   Musculoskeletal:         General: No swelling.   Skin:     General: Skin is warm and dry.   Neurological:      Mental Status: He is alert and oriented to person, place, and time.   Psychiatric:         Mood and Affect: Mood normal.           Last Labs:  CBC -  Lab Results   Component Value Date    WBC 7.7 04/20/2024    HGB 14.5 04/20/2024    HCT 43.9 04/20/2024     MCV 96 04/20/2024     04/20/2024       CMP -  Lab Results   Component Value Date    CALCIUM 9.0 04/20/2024    PHOS 3.2 08/29/2022    PROT 7.3 03/07/2024    ALBUMIN 4.4 03/07/2024    AST 26 03/07/2024    ALT 54 (H) 03/07/2024    ALKPHOS 46 03/07/2024    BILITOT 0.5 03/07/2024       LIPID PANEL -   Lab Results   Component Value Date    CHOL 161 05/03/2024    TRIG 267 (H) 05/03/2024    HDL 37.1 05/03/2024    CHHDL 4.3 05/03/2024    LDLF 94 09/16/2023    VLDL 53 (H) 05/03/2024    NHDL 124 05/03/2024       RENAL FUNCTION PANEL -   Lab Results   Component Value Date    GLUCOSE 106 (H) 04/20/2024     04/20/2024    K 4.5 04/20/2024     04/20/2024    CO2 28 04/20/2024    ANIONGAP 10 04/20/2024    BUN 17 04/20/2024    CREATININE 1.03 04/20/2024    GFRMALE >90 09/16/2023    CALCIUM 9.0 04/20/2024    PHOS 3.2 08/29/2022    ALBUMIN 4.4 03/07/2024        Lab Results   Component Value Date    BNP 34 03/07/2024       Last Cardiology Tests:    Echo:  Echocardiogram Stress Test 03/26/2024--Baseline Echo: Definity used as a contrast agent for endocardial border definition. Total contrast used for this procedure was 2 mL via IV push. The left ventricular internal cavity size is normal. Global LV systolic function is normal. The resting baseline ejection fraction was estimated at 55 to 60%. There are no regional wall motion abnormalities at baseline.     Stress Echo: At peak, there are stress-induced regional wall motion abnormalities. The ejection fraction is approximately 55 to 60% at peak stress. At peak stress there is anterior, anteroapical hypokinesis.     Summary:   1. The resting ejection fraction was estimated at 55 to 60% with a peak exercise ejection fraction estimated at 55 to 60%.   2. Normal global left ventricular systolic function.   3. Adequate level of stress achieved.   4. At peak, there are stress-induced regional wall motion abnormalities.   5. Normal peak stress global LV systolic function.    6. This is a positive exercise stress echocardiogram for moderate ischemia, suggestive of left anterior descending coronary artery disease.    Cath:  Cardiac Catheterization Procedure 05/01/2024--CONCLUSIONS:   1. The 1st obtuse marginal branch showed moderate mid-segment atherosclerotic disease.   2. Left dominant circulation. Mild to moderate nonobstructive disease.   3. The OM2 to demonstrated ~ 50-70% proximal to mid stenosis - ongoing medical therapy is recommended for this.   4. The LAD demonstrated initial catheter-induced spasm, which improved on subsequent angiography. There is a patent mid LAD stent without significant ISR.   5. The RCA is nondominant and demonstrates moderate mid atherosclerotic disease.   6. No significant LV-AO peak to peak pullback gradient.   7. Left Ventricular end-diastolic pressure = 9.    Stress Test:  Nuclear Stress Test 10/24/2022--IMPRESSION:  Medium-sized area of fixed perfusion defect of the apical, apical and  mid anterior, apical and mid anteroseptal segments, consistent with  myocardial scar or hibernating myocardium in the LAD territory.  Normal left ventricular systolic function on post stress gated  imaging.      No recent results to review    Assessment/Plan   Problem List Items Addressed This Visit             ICD-10-CM       Cardiac and Vasculature    ASHD (arteriosclerotic heart disease) - Primary I25.10    Relevant Medications    atorvastatin (Lipitor) 80 mg tablet    Entresto 24-26 mg tablet    metoprolol succinate XL (Toprol-XL) 100 mg 24 hr tablet    Chest pain R07.9    Hypertriglyceridemia E78.1    LV dysfunction I51.9    Relevant Medications    atorvastatin (Lipitor) 80 mg tablet    Entresto 24-26 mg tablet    metoprolol succinate XL (Toprol-XL) 100 mg 24 hr tablet    NSVT (nonsustained ventricular tachycardia) (Multi) I47.29    Relevant Medications    atorvastatin (Lipitor) 80 mg tablet    Entresto 24-26 mg tablet    metoprolol succinate XL (Toprol-XL) 100 mg  24 hr tablet     Other Visit Diagnoses         Codes    Coronary artery disease involving native coronary artery of native heart, unspecified whether angina present     I25.10    Relevant Medications    atorvastatin (Lipitor) 80 mg tablet    Entresto 24-26 mg tablet    metoprolol succinate XL (Toprol-XL) 100 mg 24 hr tablet          history of ASHD/MI--doing very well without chest pain or anginal symptoms.  Again his brilinta prescription has  and he has not gotten a refill so at this time we will keep him off of the 60 mg of Brilinta twice daily.  Stressed to the patient for lifelong aspirin and to continue Entresto, beta-blocker and his atorvastatin along with other adjunct treatments for high cholesterol.  He is due for follow-up labs that he will get done in a fasting state.  History of LV dysfunction--doing very well without signs or symptoms of congestive heart failure and blood pressures are little on the low side but he is completely asymptomatic.  Will continue current medical regimen.  Hyperlipidemia--again the patient is on high intensity dose atorvastatin but was not approved for Vascepa.  He is taking a over-the-counter fish oil supplementation and a preparation from [employer] called cholesterol support and due for follow-up fasting lipids and will get back to them regarding those results.  Overall patient is doing very well from a cardiac standpoint with no cardiac complaints or concerns.  He will continue current medical regimen and get the labs we will arrange Dr. Ha in 6 months time.  Should there be any change in status he is instructed to contact our office.      Asa Vieira PA-C  3/25/2025  3:41 PM

## 2025-04-13 LAB
ALT SERPL-CCNC: 69 U/L (ref 9–46)
ANION GAP SERPL CALCULATED.4IONS-SCNC: 9 MMOL/L (CALC) (ref 7–17)
AST SERPL-CCNC: 43 U/L (ref 10–35)
BUN SERPL-MCNC: 14 MG/DL (ref 7–25)
BUN/CREAT SERPL: ABNORMAL (CALC) (ref 6–22)
CALCIUM SERPL-MCNC: 8.9 MG/DL (ref 8.6–10.3)
CHLORIDE SERPL-SCNC: 106 MMOL/L (ref 98–110)
CHOLEST SERPL-MCNC: 167 MG/DL
CHOLEST/HDLC SERPL: 3.7 (CALC)
CO2 SERPL-SCNC: 26 MMOL/L (ref 20–32)
CREAT SERPL-MCNC: 0.94 MG/DL (ref 0.7–1.3)
EGFRCR SERPLBLD CKD-EPI 2021: 96 ML/MIN/1.73M2
GLUCOSE SERPL-MCNC: 101 MG/DL (ref 65–99)
HDLC SERPL-MCNC: 45 MG/DL
LDLC SERPL CALC-MCNC: 101 MG/DL (CALC)
NONHDLC SERPL-MCNC: 122 MG/DL (CALC)
POTASSIUM SERPL-SCNC: 4.7 MMOL/L (ref 3.5–5.3)
SODIUM SERPL-SCNC: 141 MMOL/L (ref 135–146)
TRIGL SERPL-MCNC: 114 MG/DL

## 2025-06-25 ENCOUNTER — TELEPHONE (OUTPATIENT)
Dept: CARDIOLOGY | Facility: HOSPITAL | Age: 55
End: 2025-06-25
Payer: COMMERCIAL

## 2025-06-25 NOTE — TELEPHONE ENCOUNTER
Patient called stating that he would like to speak to the care team of  :   Patient states he is a  - and needs a note for okay to  / medications that he is on.   Patient stated that Suzette helped him last time with this.   Patient would like to also know if someone has a heart attack if we are able to prescribe weight loss medications : I informed patient that these questions would be for the nurses.     Routing message to care team to assist.

## 2025-06-27 ENCOUNTER — TELEPHONE (OUTPATIENT)
Dept: CARDIOLOGY | Facility: HOSPITAL | Age: 55
End: 2025-06-27
Payer: COMMERCIAL

## 2025-06-27 NOTE — TELEPHONE ENCOUNTER
6/27/25  1345  Called and spoke with patient, informed Dr. Ha not in office until next week.    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    Nurse informed patient that Dr. Mckeon may not sign off on form without seeing the patient.    Patient verbalized understanding.

## 2025-07-28 ENCOUNTER — TELEPHONE (OUTPATIENT)
Dept: CARDIOLOGY | Facility: HOSPITAL | Age: 55
End: 2025-07-28
Payer: COMMERCIAL

## 2025-07-28 NOTE — TELEPHONE ENCOUNTER
Patient called in, left Vm, requesting that insurance is unable to fill current medication since recent change. Wanted to know if provider could contact the insurance company justifying reasoning for medications. Will forward on to Dr. Ha's team for review.

## 2025-07-30 NOTE — TELEPHONE ENCOUNTER
RN called pt at this time, he had a recent insurance change and they are now requiring a prior auth for his entresto. RN was able to get the insurance information.

## 2025-08-01 ENCOUNTER — TELEPHONE (OUTPATIENT)
Dept: CARDIOLOGY | Facility: HOSPITAL | Age: 55
End: 2025-08-01
Payer: COMMERCIAL

## 2025-08-01 DIAGNOSIS — I25.10 CORONARY ARTERY DISEASE INVOLVING NATIVE CORONARY ARTERY OF NATIVE HEART, UNSPECIFIED WHETHER ANGINA PRESENT: ICD-10-CM

## 2025-08-01 DIAGNOSIS — I51.9 LV DYSFUNCTION: Primary | ICD-10-CM

## 2025-08-01 DIAGNOSIS — I25.10 ASHD (ARTERIOSCLEROTIC HEART DISEASE): ICD-10-CM

## 2025-08-01 NOTE — TELEPHONE ENCOUNTER
Rn called pt at this time regarding being denied for Entresto with his new insurance. RN set paperwork up front for coupons and assistance paperwork. RN also placed a clinic pharmacy referral.

## 2025-08-01 NOTE — TELEPHONE ENCOUNTER
----- Message from Shannan MCLEAN sent at 7/31/2025 11:49 AM EDT -----  Regarding: med rejected Entresto  Patient left voicemail message that ins rejected Entresto, needs to know what the plan is now

## 2025-08-04 ENCOUNTER — DOCUMENTATION (OUTPATIENT)
Dept: CARDIOLOGY | Facility: HOSPITAL | Age: 55
End: 2025-08-04
Payer: COMMERCIAL

## 2025-08-04 NOTE — PROGRESS NOTES
8/4/25  0838  PA for Entresto submitted and nurse spoke with Caitlin at [facility].    PA with approval with PA #[id number] and authorized from 7/5/25-Aug 2026.    Approval fax pending.

## 2025-08-04 NOTE — PROGRESS NOTES
"  Pharmacist Clinic: Cardiology Management    Enrrique Mariano is a 55 y.o. male was referred to Clinical Pharmacy Team for CAD management.     Referring Provider: Francisco Ha MD    THIS IS A NEW PATIENT APPOINTMENT. PATIENT WILL BE ESTABLISHING CARE WITH CLINICAL PHARMACY.    Appointment was completed by Enrrique who was reached at primary number.    Allergies Reviewed? Yes    Allergies[1]    Medical History[2]    Medications Ordered Prior to Encounter[3]      RELEVANT LAB RESULTS:  Lab Results   Component Value Date    BILITOT 0.5 03/07/2024    CALCIUM 8.9 04/12/2025    CO2 26 04/12/2025     04/12/2025    CREATININE 0.94 04/12/2025    GLUCOSE 101 (H) 04/12/2025    ALKPHOS 46 03/07/2024    K 4.7 04/12/2025    PROT 7.3 03/07/2024     04/12/2025    AST 43 (H) 04/12/2025    ALT 69 (H) 04/12/2025    BUN 14 04/12/2025    ANIONGAP 9 04/12/2025    MG 1.97 03/07/2024    PHOS 3.2 08/29/2022    ALBUMIN 4.4 03/07/2024    LIPASE 33 06/24/2019    GFRMALE >90 09/16/2023     Lab Results   Component Value Date    TRIG 114 04/12/2025    CHOL 167 04/12/2025    LDLCALC 101 (H) 04/12/2025    HDL 45 04/12/2025     No results found for: \"BMCBC\", \"CBCDIF\"     PHARMACEUTICAL ASSESSMENT:    MEDICATION RECONCILIATION    Was a medication reconciliation completed at this visit? Yes  Home Pharmacy Reviewed? Yes, [facility]    Changed:  - sig for melatonin    Drug Interactions? No    Medication Documentation Review Audit       Reviewed by Basilio HicksD (Pharmacist) on 08/06/25 at 1242      Medication Order Taking? Sig Documenting Provider Last Dose Status   aspirin 81 mg chewable tablet [id number] Yes Chew 1 tablet (81 mg) once daily. Asa Vieira PA-C  Active   atorvastatin (Lipitor) 80 mg tablet [id number] Yes Take 1 tablet (80 mg) by mouth once daily at bedtime. Asa Vieira PA-C  Active     Discontinued 08/05/25 1340   lisinopril 20 mg tablet [id number]  Take 1 tablet (20 mg) by mouth " once daily.   Patient not taking: Reported on 8/6/2025    Francisco Ha MD  Active   melatonin 3 mg capsule [id number] Yes Take by mouth.   Patient taking differently: Take 1 tablet by mouth once daily as needed (insomnia).    Historical Provider, MD  Active   metoprolol succinate XL (Toprol-XL) 100 mg 24 hr tablet [id number] Yes Take 1 tablet (100 mg) by mouth once daily. Asa Vieira PA-C  Active   omeprazole (PriLOSEC) 20 mg DR capsule [id number] Yes Take 1 capsule (20 mg) by mouth once daily. Historical Provider, MD  Active                    DISEASE MANAGEMENT ASSESSMENT:     CHF ASSESSMENT     Symptom/Staging:  -Most recent ejection fraction: 55-60%    Guideline-Directed Medical Therapy:  -ARNI: Yes, describe: Entresto 24/26mg BID (SCr 0.94, eGFR 96, K 4.7 (4/12/25)   -Beta Blocker: Yes, describe: metoprolol XL 100mg every day   -MRA: No  -SGLT2i: No    Secondary Prevention:  -The ASCVD Risk score (Sandro ROBERT, et al., 2019) failed to calculate for the following reasons:    Risk score cannot be calculated because patient has a medical history suggesting prior/existing ASCVD   -Aspirin 81mg? yes  -Statin?: Yes, describe: atorvastatin 80mg every day ( (4/12/25)  -HTN?: No    CURRENT PHARMACOTHERAPY:   GDMT 2/4  Entresto 24/26mg BID (SCr 0.94, eGFR 96, K 4.7 (4/12/25)  Metoprolol XL 100mg every day     Affordability: Entresto $670/30ds   Adherence/Compliance: reports adherence has a pill box   Adverse Effects: none reported     Monitoring Weights at Home: No  Home Weight Recordings: 193 lb at appointment     Past In Office Weight Readings:   Wt Readings from Last 6 Encounters:   03/25/25 89.4 kg (197 lb)   12/17/24 87.1 kg (192 lb)   12/06/24 83.9 kg (185 lb)   09/24/24 88 kg (194 lb)   05/10/24 91.2 kg (201 lb)   05/01/24 90.7 kg (200 lb)       Monitoring Blood Pressure at Home: No  Home BP Recordings: just at appointments     Past In Office BP Readings:   BP Readings from Last 6 Encounters:   03/25/25  96/60   12/17/24 120/72   12/06/24 119/75   09/24/24 108/72   05/10/24 108/68   05/01/24 103/66       HEALTH MANAGEMENT    Maintaining fluid restriction (<2 L/day): N/A  Edema/swelling: No  Shortness of breath: No  Trouble sleeping/lying down: No  Dry/hacking cough: No  Recent Hospitalizations: No    EDUCATION/COUNSELING:   - Counseled patient on MOA, expectations, duration of therapy, contraindications, administration, and monitoring parameters  - Counseled patient on lifestyle modifications that can decrease your risk of having complications (smoking cessation, losing weight, daily weights, vaccines)  - Counseled patient on fluid intake and weight management. Recommended to not consume more than 2 liters of fliuids per day. If they have gained more than 2-3 pounds within a 24 hours period (or 5 pounds in a week), contact their cardiologist  - Answered all patient questions and concerns      DISCUSSION/NOTES:   Today was an initial visit to establish with clinical pharmacy. Patient medications and allergies were reviewed and updated.  Patient was referred to clinical pharmacy for cost assistance for Entresto.   Patient has a very high deductible plan where insurance is not contributing to the medication until his deductible is met. Patient states he believes he has a $7,000 deductible.   Max copay card per year is $4,100.  Patient is not willing to pay cash for the medication until he reaches his deductible and copay card is maxed out.   Entresto may have a generic option later in the year which maybe more cost effective for patient.   Will keep patient on lisinopril until Entresto becomes affordable.   Patient reports SBPs in the 90s without hypotensive symptoms.   Want to verify patient should  lisinopril 20mg since entresto 24/26mg is closer to lisinopril 10mg and BPs have been soft. Will ask Dr. Ha.   No scheduled follow up but patient has my contact with questions and pharmacy is happy to run price  check again if insurance changes/entresto generic becomes available.     ASSESSMENT:  Unfortunately, at this time, Enrrique Mariano is ineligible to receive financial assistance through  Patient Assistance Program because income > 400% [employer].       Assessment/Plan   Problem List Items Addressed This Visit       Coronary artery disease involving native coronary artery of native heart without angina pectoris - Primary    Patient is currently on GDMT 2/4  Entresto 24/26mg BID (SCr 0.94, eGFR 96, K 4.7 (4/12/25)  Metoprolol XL 100mg every day  Unfortunately due to cost will have to switch Entresto to lisinopril. Recommend lisinopril 10mg every day   Monitor BP and BMP              RECOMMENDATIONS/PLAN:    STOP  Entresto   Start  Lisinopril (verifying dose with provider)  CONTINUE  Metoprolol XL 100mg every day  Follow up if cost of Entresto changes     Last Appnt with Referring Provider: 3/25/25  Next Appnt with Referring Provider: 9/25/25  Type of Encounter: Virtual    Jessica Rock PharmD    Verbal consent to manage patient's drug therapy was obtained from the patient . They were informed they may decline to participate or withdraw from participation in pharmacy services at any time.    Continue all meds under the continuation of care with the referring provider and clinical pharmacy team.           [1] No Known Allergies  [2]   Past Medical History:  Diagnosis Date    Coronary artery disease     Other conditions influencing health status     No significant past medical history    Other iron deficiency anemias 09/15/2022    Other iron deficiency anemia   [3]   Current Outpatient Medications on File Prior to Visit   Medication Sig Dispense Refill    aspirin 81 mg chewable tablet Chew 1 tablet (81 mg) once daily.      atorvastatin (Lipitor) 80 mg tablet Take 1 tablet (80 mg) by mouth once daily at bedtime. 90 tablet 3    melatonin 3 mg capsule Take by mouth. (Patient taking differently: Take 1 tablet by mouth  once daily as needed (insomnia).)      metoprolol succinate XL (Toprol-XL) 100 mg 24 hr tablet Take 1 tablet (100 mg) by mouth once daily. 90 tablet 3    omeprazole (PriLOSEC) 20 mg DR capsule Take 1 capsule (20 mg) by mouth once daily.      lisinopril 20 mg tablet Take 1 tablet (20 mg) by mouth once daily. (Patient not taking: Reported on 8/6/2025) 30 tablet 11    [DISCONTINUED] Entresto 24-26 mg tablet Take 1 tablet by mouth 2 times a day. 180 tablet 3     No current facility-administered medications on file prior to visit.

## 2025-08-05 DIAGNOSIS — I51.9 LV DYSFUNCTION: ICD-10-CM

## 2025-08-05 DIAGNOSIS — I25.10 ASHD (ARTERIOSCLEROTIC HEART DISEASE): Primary | ICD-10-CM

## 2025-08-05 RX ORDER — LISINOPRIL 20 MG/1
20 TABLET ORAL DAILY
Qty: 30 TABLET | Refills: 11 | Status: SHIPPED | OUTPATIENT
Start: 2025-08-05 | End: 2025-08-08 | Stop reason: SDUPTHER

## 2025-08-05 NOTE — PROGRESS NOTES
Insurance will not cover entresto - will replace with lisinopril 20mg daily.  Sent to pharmacy, pt to be instructed to monitor BP's on new med and to call us with low/high BP's.

## 2025-08-06 ENCOUNTER — TELEMEDICINE (OUTPATIENT)
Dept: PHARMACY | Facility: HOSPITAL | Age: 55
End: 2025-08-06
Payer: COMMERCIAL

## 2025-08-06 DIAGNOSIS — I25.10 CORONARY ARTERY DISEASE INVOLVING NATIVE CORONARY ARTERY OF NATIVE HEART WITHOUT ANGINA PECTORIS: Primary | ICD-10-CM

## 2025-08-06 NOTE — Clinical Note
Osmin Ha, Spoke with Enrrique today to try and get him restarted on Entresto. Since switching insurance plans he now has a very high deductible that even to copay coupons would max out before he reaches. He is not agreeable to paying out of pocket for the medication till he reaches his deductible. He does not qualify for the cost assistance program due to income. Really only option is to keep him on lisinopril until a generic of Entresto comes out (sometime later this year) or he changes insurance plans which I recommended but he said he is very limited with his current company.  I wanted to double check with you though, patient states his BP has been around 90s/60s while on the entresto 24/26. Wanted to check you want lisinopril 20mg, the 24/26 is more equivalent to 10mg and since his BPs have been on the softer side think we might want to start out a little lower? Told patient I would call him back tomorrow with which dose to .  Thank you for the consult.

## 2025-08-06 NOTE — ASSESSMENT & PLAN NOTE
Patient is currently on GDMT 2/4  Entresto 24/26mg BID (SCr 0.94, eGFR 96, K 4.7 (4/12/25)  Metoprolol XL 100mg every day  Unfortunately due to cost will have to switch Entresto to lisinopril. Recommend lisinopril 10mg every day   Monitor BP and BMP

## 2025-08-08 ENCOUNTER — TELEPHONE (OUTPATIENT)
Dept: PHARMACY | Facility: HOSPITAL | Age: 55
End: 2025-08-08
Payer: COMMERCIAL

## 2025-08-08 ENCOUNTER — TELEPHONE (OUTPATIENT)
Dept: CARDIOLOGY | Facility: HOSPITAL | Age: 55
End: 2025-08-08
Payer: COMMERCIAL

## 2025-08-08 DIAGNOSIS — I25.10 ASHD (ARTERIOSCLEROTIC HEART DISEASE): ICD-10-CM

## 2025-08-08 DIAGNOSIS — I51.9 LV DYSFUNCTION: ICD-10-CM

## 2025-08-08 RX ORDER — LISINOPRIL 20 MG/1
10 TABLET ORAL DAILY
Start: 2025-08-08 | End: 2025-08-08 | Stop reason: SDUPTHER

## 2025-08-08 RX ORDER — LISINOPRIL 10 MG/1
10 TABLET ORAL DAILY
Qty: 90 TABLET | Refills: 3 | Status: SHIPPED | OUTPATIENT
Start: 2025-08-08 | End: 2026-08-08

## 2025-08-08 NOTE — TELEPHONE ENCOUNTER
Rn called pt at this time regarding change to Lisinopril from entresto due to costs. Pt verbalized understanding,.

## 2025-08-11 ENCOUNTER — TELEPHONE (OUTPATIENT)
Dept: CARDIOLOGY | Facility: HOSPITAL | Age: 55
End: 2025-08-11
Payer: COMMERCIAL

## 2025-08-11 DIAGNOSIS — I51.9 LV DYSFUNCTION: ICD-10-CM

## 2025-08-11 DIAGNOSIS — I25.10 ASHD (ARTERIOSCLEROTIC HEART DISEASE): Primary | ICD-10-CM

## (undated) DEVICE — TR BAND, RADIAL COMPRESSION, STANDARD, 24CM

## (undated) DEVICE — GUIDEWIRE, INQWIRE, 3MM J, .035 X 210CM, FIXED

## (undated) DEVICE — CATHETER, OPTITORQUE, 6FR, JACKY, BL3.5/2H/100CM

## (undated) DEVICE — CATHETER, DIAGNOSTIC, DXTERITY, 6FR JR 4.0, 100CM

## (undated) DEVICE — CATHETER, DIAGNOSTIC, DXTERITY, 6FR 100CM, JL35

## (undated) DEVICE — CATHETER, DIAGNOSTIC, DXTERITY, JL 3.5, 100CM

## (undated) DEVICE — SHEATH, GLIDESHEATH, SLENDER, 6FR 10CM